# Patient Record
Sex: FEMALE | Race: ASIAN | Employment: OTHER | URBAN - METROPOLITAN AREA
[De-identification: names, ages, dates, MRNs, and addresses within clinical notes are randomized per-mention and may not be internally consistent; named-entity substitution may affect disease eponyms.]

---

## 2018-12-17 ENCOUNTER — OFFICE VISIT (OUTPATIENT)
Dept: INTERNAL MEDICINE CLINIC | Age: 83
End: 2018-12-17

## 2018-12-17 VITALS
DIASTOLIC BLOOD PRESSURE: 70 MMHG | BODY MASS INDEX: 26.31 KG/M2 | HEIGHT: 62 IN | SYSTOLIC BLOOD PRESSURE: 120 MMHG | HEART RATE: 40 BPM | WEIGHT: 143 LBS

## 2018-12-17 DIAGNOSIS — I10 HYPERTENSION, UNSPECIFIED TYPE: Primary | ICD-10-CM

## 2018-12-17 LAB
BILIRUBIN, POC: NORMAL
BLOOD URINE, POC: NORMAL
CLARITY, POC: CLEAR
COLOR, POC: NORMAL
GLUCOSE URINE, POC: NORMAL
KETONES, POC: NORMAL
LEUKOCYTE EST, POC: NORMAL
NITRITE, POC: NORMAL
PH, POC: 5
PROTEIN, POC: NORMAL
SPECIFIC GRAVITY, POC: 1.01
UROBILINOGEN, POC: 1

## 2018-12-17 PROCEDURE — 99205 OFFICE O/P NEW HI 60 MIN: CPT | Performed by: INTERNAL MEDICINE

## 2018-12-17 PROCEDURE — 1036F TOBACCO NON-USER: CPT | Performed by: INTERNAL MEDICINE

## 2018-12-17 PROCEDURE — G8419 CALC BMI OUT NRM PARAM NOF/U: HCPCS | Performed by: INTERNAL MEDICINE

## 2018-12-17 PROCEDURE — 1090F PRES/ABSN URINE INCON ASSESS: CPT | Performed by: INTERNAL MEDICINE

## 2018-12-17 PROCEDURE — 1101F PT FALLS ASSESS-DOCD LE1/YR: CPT | Performed by: INTERNAL MEDICINE

## 2018-12-17 PROCEDURE — G8484 FLU IMMUNIZE NO ADMIN: HCPCS | Performed by: INTERNAL MEDICINE

## 2018-12-17 PROCEDURE — 4040F PNEUMOC VAC/ADMIN/RCVD: CPT | Performed by: INTERNAL MEDICINE

## 2018-12-17 PROCEDURE — G8427 DOCREV CUR MEDS BY ELIG CLIN: HCPCS | Performed by: INTERNAL MEDICINE

## 2018-12-17 PROCEDURE — 1123F ACP DISCUSS/DSCN MKR DOCD: CPT | Performed by: INTERNAL MEDICINE

## 2018-12-17 PROCEDURE — 93000 ELECTROCARDIOGRAM COMPLETE: CPT | Performed by: INTERNAL MEDICINE

## 2018-12-17 PROCEDURE — 81002 URINALYSIS NONAUTO W/O SCOPE: CPT | Performed by: INTERNAL MEDICINE

## 2018-12-17 ASSESSMENT — PATIENT HEALTH QUESTIONNAIRE - PHQ9
2. FEELING DOWN, DEPRESSED OR HOPELESS: 1
SUM OF ALL RESPONSES TO PHQ QUESTIONS 1-9: 2
SUM OF ALL RESPONSES TO PHQ9 QUESTIONS 1 & 2: 2
1. LITTLE INTEREST OR PLEASURE IN DOING THINGS: 1
SUM OF ALL RESPONSES TO PHQ QUESTIONS 1-9: 2

## 2018-12-17 NOTE — PROGRESS NOTES
Right-sided epistaxis           Plan:      Repeat labs now with TSH    Check Echo to r/o AS  check carotid doppler    BP cuff correlation in 2 weeks     also now worried about her swallowing. .. X 4 months  Wt stable I live upstairs in a 2 story house  Daughter wants her to go to a home and I don't want to go. .  I want to remodel downstairs. .  Renovation is done now    Daughter interprets [place]. ..     F/u in 4 weeks    Denisha Alexander      HM: UTD  Advised annual fluvax every Salvador Romero MD

## 2019-01-11 ENCOUNTER — HOSPITAL ENCOUNTER (EMERGENCY)
Age: 84
Discharge: HOME | End: 2019-01-11
Attending: EMERGENCY MEDICINE
Payer: MEDICARE

## 2019-01-11 VITALS
WEIGHT: 138.44 LBS | HEIGHT: 64 IN | HEART RATE: 66 BPM | BODY MASS INDEX: 23.64 KG/M2 | DIASTOLIC BLOOD PRESSURE: 59 MMHG | TEMPERATURE: 97.5 F | OXYGEN SATURATION: 97 % | SYSTOLIC BLOOD PRESSURE: 125 MMHG | RESPIRATION RATE: 18 BRPM

## 2019-01-11 DIAGNOSIS — F43.0 STRESS REACTION: ICD-10-CM

## 2019-01-11 DIAGNOSIS — R11.0 NAUSEA: Primary | ICD-10-CM

## 2019-01-11 LAB
A/G RATIO: 1.7 (ref 1.1–2.2)
ALBUMIN SERPL-MCNC: 4.8 G/DL (ref 3.4–5)
ALP BLD-CCNC: 82 U/L (ref 40–129)
ALT SERPL-CCNC: 24 U/L (ref 10–40)
AMYLASE: 119 U/L (ref 25–115)
ANION GAP SERPL CALCULATED.3IONS-SCNC: 9 MMOL/L (ref 3–16)
AST SERPL-CCNC: 29 U/L (ref 15–37)
BACTERIA: ABNORMAL /HPF
BASOPHILS ABSOLUTE: 0 K/UL (ref 0–0.2)
BASOPHILS RELATIVE PERCENT: 0.5 %
BILIRUB SERPL-MCNC: 1 MG/DL (ref 0–1)
BILIRUBIN URINE: NEGATIVE
BLOOD, URINE: NEGATIVE
BUN BLDV-MCNC: 16 MG/DL (ref 7–20)
CALCIUM SERPL-MCNC: 9.7 MG/DL (ref 8.3–10.6)
CHLORIDE BLD-SCNC: 94 MMOL/L (ref 99–110)
CLARITY: ABNORMAL
CO2: 30 MMOL/L (ref 21–32)
COLOR: YELLOW
CREAT SERPL-MCNC: 0.7 MG/DL (ref 0.6–1.2)
EKG ATRIAL RATE: 69 BPM
EKG DIAGNOSIS: NORMAL
EKG P AXIS: 58 DEGREES
EKG P-R INTERVAL: 156 MS
EKG Q-T INTERVAL: 418 MS
EKG QRS DURATION: 90 MS
EKG QTC CALCULATION (BAZETT): 447 MS
EKG R AXIS: 5 DEGREES
EKG T AXIS: 47 DEGREES
EKG VENTRICULAR RATE: 69 BPM
EOSINOPHILS ABSOLUTE: 0 K/UL (ref 0–0.6)
EOSINOPHILS RELATIVE PERCENT: 0.9 %
EPITHELIAL CELLS, UA: 2 /HPF (ref 0–5)
GFR AFRICAN AMERICAN: >60
GFR NON-AFRICAN AMERICAN: >60
GLOBULIN: 2.9 G/DL
GLUCOSE BLD-MCNC: 106 MG/DL (ref 70–99)
GLUCOSE URINE: NEGATIVE MG/DL
HCT VFR BLD CALC: 43.6 % (ref 36–48)
HEMOGLOBIN: 14.5 G/DL (ref 12–16)
HYALINE CASTS: 0 /LPF (ref 0–8)
KETONES, URINE: NEGATIVE MG/DL
LEUKOCYTE ESTERASE, URINE: NEGATIVE
LIPASE: 76 U/L (ref 13–60)
LYMPHOCYTES ABSOLUTE: 0.9 K/UL (ref 1–5.1)
LYMPHOCYTES RELATIVE PERCENT: 17.8 %
MCH RBC QN AUTO: 29.5 PG (ref 26–34)
MCHC RBC AUTO-ENTMCNC: 33.2 G/DL (ref 31–36)
MCV RBC AUTO: 89.1 FL (ref 80–100)
MICROSCOPIC EXAMINATION: YES
MONOCYTES ABSOLUTE: 0.4 K/UL (ref 0–1.3)
MONOCYTES RELATIVE PERCENT: 7.7 %
NEUTROPHILS ABSOLUTE: 3.7 K/UL (ref 1.7–7.7)
NEUTROPHILS RELATIVE PERCENT: 73.1 %
NITRITE, URINE: NEGATIVE
PDW BLD-RTO: 14.7 % (ref 12.4–15.4)
PH UA: 8
PLATELET # BLD: 192 K/UL (ref 135–450)
PMV BLD AUTO: 7.8 FL (ref 5–10.5)
POTASSIUM SERPL-SCNC: 4.4 MMOL/L (ref 3.5–5.1)
PROTEIN UA: NEGATIVE MG/DL
RBC # BLD: 4.89 M/UL (ref 4–5.2)
RBC UA: 2 /HPF (ref 0–4)
SODIUM BLD-SCNC: 133 MMOL/L (ref 136–145)
SPECIFIC GRAVITY UA: 1.01
TOTAL PROTEIN: 7.7 G/DL (ref 6.4–8.2)
TROPONIN: <0.01 NG/ML
URINE REFLEX TO CULTURE: ABNORMAL
URINE TYPE: ABNORMAL
UROBILINOGEN, URINE: 0.2 E.U./DL
WBC # BLD: 5 K/UL (ref 4–11)
WBC UA: 1 /HPF (ref 0–5)

## 2019-01-11 PROCEDURE — 93010 ELECTROCARDIOGRAM REPORT: CPT | Performed by: INTERNAL MEDICINE

## 2019-01-11 PROCEDURE — 99283 EMERGENCY DEPT VISIT LOW MDM: CPT

## 2019-01-11 PROCEDURE — 80053 COMPREHEN METABOLIC PANEL: CPT

## 2019-01-11 PROCEDURE — 81001 URINALYSIS AUTO W/SCOPE: CPT

## 2019-01-11 PROCEDURE — 82150 ASSAY OF AMYLASE: CPT

## 2019-01-11 PROCEDURE — 84484 ASSAY OF TROPONIN QUANT: CPT

## 2019-01-11 PROCEDURE — 85025 COMPLETE CBC W/AUTO DIFF WBC: CPT

## 2019-01-11 PROCEDURE — 83690 ASSAY OF LIPASE: CPT

## 2019-01-11 PROCEDURE — 93005 ELECTROCARDIOGRAM TRACING: CPT | Performed by: EMERGENCY MEDICINE

## 2019-01-11 PROCEDURE — 36415 COLL VENOUS BLD VENIPUNCTURE: CPT

## 2019-01-11 RX ORDER — ONDANSETRON 4 MG/1
4 TABLET, ORALLY DISINTEGRATING ORAL EVERY 8 HOURS PRN
Qty: 10 TABLET | Refills: 0 | Status: SHIPPED | OUTPATIENT
Start: 2019-01-11 | End: 2019-07-15

## 2019-03-29 ENCOUNTER — HOSPITAL ENCOUNTER (EMERGENCY)
Age: 84
Discharge: HOME | End: 2019-03-29
Attending: EMERGENCY MEDICINE
Payer: MEDICARE

## 2019-03-29 ENCOUNTER — APPOINTMENT (OUTPATIENT)
Dept: CT IMAGING | Age: 84
End: 2019-03-29
Payer: MEDICARE

## 2019-03-29 ENCOUNTER — APPOINTMENT (OUTPATIENT)
Dept: GENERAL RADIOLOGY | Age: 84
End: 2019-03-29
Payer: MEDICARE

## 2019-03-29 VITALS
HEIGHT: 64 IN | DIASTOLIC BLOOD PRESSURE: 66 MMHG | SYSTOLIC BLOOD PRESSURE: 156 MMHG | HEART RATE: 70 BPM | RESPIRATION RATE: 18 BRPM | WEIGHT: 138 LBS | OXYGEN SATURATION: 99 % | TEMPERATURE: 97.5 F | BODY MASS INDEX: 23.56 KG/M2

## 2019-03-29 DIAGNOSIS — I10 UNCONTROLLED HYPERTENSION: Primary | ICD-10-CM

## 2019-03-29 DIAGNOSIS — Z76.0 ENCOUNTER FOR MEDICATION REFILL: ICD-10-CM

## 2019-03-29 DIAGNOSIS — E87.1 HYPONATREMIA: ICD-10-CM

## 2019-03-29 LAB
A/G RATIO: 1.4 (ref 1.1–2.2)
ALBUMIN SERPL-MCNC: 4.3 G/DL (ref 3.4–5)
ALP BLD-CCNC: 76 U/L (ref 40–129)
ALT SERPL-CCNC: 31 U/L (ref 10–40)
ANION GAP SERPL CALCULATED.3IONS-SCNC: 14 MMOL/L (ref 3–16)
AST SERPL-CCNC: 39 U/L (ref 15–37)
BASOPHILS ABSOLUTE: 0 K/UL (ref 0–0.2)
BASOPHILS RELATIVE PERCENT: 0.9 %
BILIRUB SERPL-MCNC: 0.6 MG/DL (ref 0–1)
BUN BLDV-MCNC: 14 MG/DL (ref 7–20)
CALCIUM SERPL-MCNC: 8.9 MG/DL (ref 8.3–10.6)
CHLORIDE BLD-SCNC: 90 MMOL/L (ref 99–110)
CO2: 24 MMOL/L (ref 21–32)
CREAT SERPL-MCNC: <0.5 MG/DL (ref 0.6–1.2)
EOSINOPHILS ABSOLUTE: 0 K/UL (ref 0–0.6)
EOSINOPHILS RELATIVE PERCENT: 1.1 %
GFR AFRICAN AMERICAN: >60
GFR NON-AFRICAN AMERICAN: >60
GLOBULIN: 3.1 G/DL
GLUCOSE BLD-MCNC: 101 MG/DL (ref 70–99)
HCT VFR BLD CALC: 40.7 % (ref 36–48)
HEMOGLOBIN: 13.6 G/DL (ref 12–16)
LYMPHOCYTES ABSOLUTE: 0.9 K/UL (ref 1–5.1)
LYMPHOCYTES RELATIVE PERCENT: 22.9 %
MCH RBC QN AUTO: 29.4 PG (ref 26–34)
MCHC RBC AUTO-ENTMCNC: 33.3 G/DL (ref 31–36)
MCV RBC AUTO: 88.1 FL (ref 80–100)
MONOCYTES ABSOLUTE: 0.5 K/UL (ref 0–1.3)
MONOCYTES RELATIVE PERCENT: 12.5 %
NEUTROPHILS ABSOLUTE: 2.5 K/UL (ref 1.7–7.7)
NEUTROPHILS RELATIVE PERCENT: 62.6 %
PDW BLD-RTO: 13.3 % (ref 12.4–15.4)
PLATELET # BLD: 215 K/UL (ref 135–450)
PMV BLD AUTO: 7.4 FL (ref 5–10.5)
POTASSIUM SERPL-SCNC: 4.2 MMOL/L (ref 3.5–5.1)
RBC # BLD: 4.62 M/UL (ref 4–5.2)
SODIUM BLD-SCNC: 128 MMOL/L (ref 136–145)
TOTAL PROTEIN: 7.4 G/DL (ref 6.4–8.2)
TROPONIN: <0.01 NG/ML
WBC # BLD: 4 K/UL (ref 4–11)

## 2019-03-29 PROCEDURE — 85025 COMPLETE CBC W/AUTO DIFF WBC: CPT

## 2019-03-29 PROCEDURE — 84484 ASSAY OF TROPONIN QUANT: CPT

## 2019-03-29 PROCEDURE — 80053 COMPREHEN METABOLIC PANEL: CPT

## 2019-03-29 PROCEDURE — 71045 X-RAY EXAM CHEST 1 VIEW: CPT

## 2019-03-29 PROCEDURE — 70450 CT HEAD/BRAIN W/O DYE: CPT

## 2019-03-29 PROCEDURE — 99284 EMERGENCY DEPT VISIT MOD MDM: CPT

## 2019-03-29 PROCEDURE — 93005 ELECTROCARDIOGRAM TRACING: CPT | Performed by: EMERGENCY MEDICINE

## 2019-03-29 RX ORDER — LOSARTAN POTASSIUM 50 MG/1
50 TABLET ORAL 2 TIMES DAILY
Qty: 30 TABLET | Refills: 1 | Status: SHIPPED | OUTPATIENT
Start: 2019-03-29

## 2019-03-29 ASSESSMENT — ENCOUNTER SYMPTOMS
VOMITING: 0
SHORTNESS OF BREATH: 0
ABDOMINAL DISTENTION: 0
DIARRHEA: 0
NAUSEA: 0
COUGH: 0
STRIDOR: 0
COLOR CHANGE: 0
WHEEZING: 0
ABDOMINAL PAIN: 0
BACK PAIN: 0
ALLERGIC/IMMUNOLOGIC NEGATIVE: 1
CONSTIPATION: 0

## 2019-03-30 LAB
EKG ATRIAL RATE: 78 BPM
EKG DIAGNOSIS: NORMAL
EKG P AXIS: 52 DEGREES
EKG P-R INTERVAL: 162 MS
EKG Q-T INTERVAL: 418 MS
EKG QRS DURATION: 90 MS
EKG QTC CALCULATION (BAZETT): 476 MS
EKG R AXIS: -4 DEGREES
EKG T AXIS: 54 DEGREES
EKG VENTRICULAR RATE: 78 BPM

## 2019-03-30 PROCEDURE — 93010 ELECTROCARDIOGRAM REPORT: CPT | Performed by: INTERNAL MEDICINE

## 2019-03-30 NOTE — ED PROVIDER NOTES
systems for level 4, 10 or more for level 5)     Review of Systems   Constitutional: Negative for chills and fever. HENT: Negative. Eyes: Negative for visual disturbance. Respiratory: Negative for cough, shortness of breath, wheezing and stridor. Cardiovascular: Negative for chest pain, palpitations and leg swelling. Gastrointestinal: Negative for abdominal distention, abdominal pain, constipation, diarrhea, nausea and vomiting. Endocrine: Negative. Genitourinary: Negative. Musculoskeletal: Negative for back pain, neck pain and neck stiffness. Skin: Negative for color change, pallor, rash and wound. Allergic/Immunologic: Negative. Neurological: Negative for dizziness, tremors, seizures, syncope, facial asymmetry, speech difficulty, weakness, light-headedness, numbness and headaches. Hematological: Negative. Psychiatric/Behavioral: Negative for confusion. All other systems reviewed and are negative. Positives and Pertinent negatives as per HPI. Except as noted abovein the ROS, all other systems were reviewed and negative. PAST MEDICAL HISTORY     Past Medical History:   Diagnosis Date    Hypertension     Vertigo     at times         SURGICAL HISTORY     Past Surgical History:   Procedure Laterality Date    APPENDECTOMY      HYSTERECTOMY      THYROID SURGERY           [address]       Current Discharge Medication List      CONTINUE these medications which have NOT CHANGED    Details   ondansetron (ZOFRAN ODT) 4 MG disintegrating tablet Take 1 tablet by mouth every 8 hours as needed for Nausea Let dissolve in mouth. Qty: 10 tablet, Refills: 0      hydrochlorothiazide (MICROZIDE) 12.5 MG capsule Take 12.5 mg by mouth daily      Aloe-Sodium Chloride (AYR SALINE NASAL GEL) SWAB Apply in both nostrils twice a day for nasal dryness  Qty: 1 each, Refills: 2    Associated Diagnoses: Right-sided epistaxis      aspirin 81 MG EC tablet Take 1 tablet by mouth daily.  Do not take this until the bleeding is gone for 48 hours. Qty: 30 tablet, Refills: 0      calcium carbonate (OSCAL) 500 MG TABS tablet Take 500 mg by mouth daily. therapeutic multivitamin-minerals (THERAGRAN-M) tablet Take 1 tablet by mouth daily.                  ALLERGIES     Ciprofloxacin; Seasonal; and Zoloft [sertraline hcl]    FAMILYHISTORY       Family History   Problem Relation Age of Onset    Diabetes Mother     Heart Disease Father           SOCIAL HISTORY       Social History     Socioeconomic History    Marital status: Single     Spouse name: None    Number of children: None    Years of education: None    Highest education level: None   Occupational History    None   Social Needs    Financial resource strain: None    Food insecurity:     Worry: None     Inability: None    Transportation needs:     Medical: None     Non-medical: None   Tobacco Use    Smoking status: Former Smoker     Last attempt to quit: 1960     Years since quittin.2    Smokeless tobacco: Never Used   Substance and Sexual Activity    Alcohol use: No    Drug use: No    Sexual activity: None   Lifestyle    Physical activity:     Days per week: None     Minutes per session: None    Stress: None   Relationships    Social connections:     Talks on phone: None     Gets together: None     Attends Anabaptist service: None     Active member of club or organization: None     Attends meetings of clubs or organizations: None     Relationship status: None    Intimate partner violence:     Fear of current or ex partner: None     Emotionally abused: None     Physically abused: None     Forced sexual activity: None   Other Topics Concern    None   Social History Narrative    None       SCREENINGS             PHYSICAL EXAM    (up to 7 for level 4, 8 or more for level 5)     ED Triage Vitals [19]   BP Temp Temp Source Pulse Resp SpO2 Height Weight   (!) 197/79 97.5 °F (36.4 °C) Oral 73 16 99 % 5' 4\" (1.626 m) 138 lb (62.6 kg)       Physical Exam   Constitutional: She is oriented to person, place, and time. She appears well-developed and well-nourished. No distress. HENT:   Head: Normocephalic and atraumatic. Right Ear: External ear normal.   Left Ear: External ear normal.   Nose: Nose normal.   Mouth/Throat: Oropharynx is clear and moist.   Eyes: Pupils are equal, round, and reactive to light. Conjunctivae and EOM are normal. Right eye exhibits no discharge. Left eye exhibits no discharge. No scleral icterus. Neck: Normal range of motion. No JVD present. Cardiovascular: Normal rate. Pulmonary/Chest: Effort normal and breath sounds normal.   Abdominal: Soft. Bowel sounds are normal. She exhibits no distension. There is no tenderness. Musculoskeletal: Normal range of motion. No extremity edema, posterior calf or thigh tenderness, palpable cord, discoloration. Negative homans. Lymphadenopathy:     She has no cervical adenopathy. Neurological: She is alert and oriented to person, place, and time. She displays normal reflexes. No cranial nerve deficit (II-XII intact) or sensory deficit. Skin: Skin is warm and dry. Capillary refill takes less than 2 seconds. No rash noted. She is not diaphoretic. No erythema. No pallor. Psychiatric: She has a normal mood and affect. Her behavior is normal.   Nursing note and vitals reviewed.       DIAGNOSTIC RESULTS   LABS:    Labs Reviewed   CBC WITH AUTO DIFFERENTIAL - Abnormal; Notable for the following components:       Result Value    Lymphocytes # 0.9 (*)     All other components within normal limits    Narrative:     Performed at:  [facility] [address], [facility], [address]   Phone [phone]   COMPREHENSIVE METABOLIC PANEL - Abnormal; Notable for the following components:    Sodium 128 (*)     Chloride 90 (*)     Glucose 101 (*)     CREATININE <0.5 (*)     AST 39 (*)     All other components within normal limits Narrative:     Performed at:  [facility]  [address] [place],  [place], [address]   Phone [phone]   TROPONIN    Narrative:     Performed at:  [facility]  [address] [place],  [place], [address]   Phone [phone]       All other labs were within normal range or not returned as of this dictation. EKG: All EKG's are interpreted by the Emergency Department Physician who either signs orCo-signs this chart in the absence of a cardiologist.  Please see their note for interpretation of EKG. RADIOLOGY:   Non-plain film images such as CT, Ultrasound and MRI are read by the radiologist. Plain radiographic images are visualized andpreliminarily interpreted by the  ED Provider with the below findings:        Interpretation perthe Radiologist below, if available at the time of this note:    CT HEAD WO CONTRAST   Final Result   No acute intracranial abnormality. XR CHEST PORTABLE   Final Result   No acute abnormality           Xr Chest Portable    Result Date: 3/29/2019  EXAMINATION: SINGLE XRAY VIEW OF THE CHEST 3/29/2019 8:50 pm COMPARISON: None. HISTORY: ORDERING SYSTEM PROVIDED HISTORY: dizzy/htn TECHNOLOGIST PROVIDED HISTORY: Reason for exam:->dizzy/htn Ordering Physician Provided Reason for Exam: Pt inby FF squad squad for high BP. Has felt it running high for two days. Denies chest pain. Dizziness upon standing from squad cot. Acuity: Acute Type of Exam: Initial FINDINGS: Heart size at the upper limits of normal.  There is no lung consolidation.  There is no effusion or pneumothorax     No acute abnormality         PROCEDURES   Unless otherwise noted below, none     Procedures    CRITICAL CARE TIME   N/A    CONSULTS:  None      EMERGENCY DEPARTMENT COURSE and DIFFERENTIALDIAGNOSIS/MDM:   Vitals:    Vitals:    03/29/19 2130 03/29/19 2132 03/29/19 2200 03/29/19 2230   BP: (!) 179/71  (!) 166/71 (!) 156/66   Pulse:  67 71 70   Resp:  20 17 18   Temp:       TempSrc:       SpO2:  97% 99% 99%   Weight:       Height:           Patient was given thefollowing medications:  Medications - No data to display  This patient presents to the emergency department with elevated blood pressure. Despite triage note, denies any dizziness. She does have some asymptomatic hyponatremia, therefore advised to follow up with primary care doctor about this finding. EKG appears stable. Has no chest pain or shortness of breath. She is simply requesting a refill of Cozaar and for her to be written for twice a day instead of once a day. CT head shows no acute intracranial abnormality. Vitals improved here. My suspicion is low for carotid dissection, sinus abscess, acute fracture, acute CVA, ICH, SAH, TIA, meningitis, encephalitis, pseudotumor cerebri, temporal arteritis, sentinel bleed from ruptured aneurysm, hypertensive urgency or emergency, subdural hematoma, epidural hematoma, ACS, PE, myocarditis, pericarditis, endocarditis, acute pulmonary edema, pleural effusion, pericardial effusion, cardiac tamponade, cardiomyopathy, CHF exacerbation, thoracic aortic dissection, esophageal rupture, other life-threatening arrhythmia, hypertensive urgency or emergency, hemothorax, pulmonary contusion, subcutaneous emphysema, flail chest, pneumo mediastinum, rib fracture, pneumonia or other concerning pathology. We have addressed concerns and expectations. FINAL IMPRESSION      1. Uncontrolled hypertension    2. Encounter for medication refill    3.  Hyponatremia          DISPOSITION/PLAN   DISPOSITION Decision To Discharge 03/29/2019 09:51:10 PM      PATIENT REFERREDTO:  Ben Conway  [address] [facility] [phone]      for follow up in 1-3 days      DISCHARGE MEDICATIONS:  Current Discharge Medication List          DISCONTINUED MEDICATIONS:  Current Discharge Medication List                 (Please note that portions ofthis note were completed with a voice recognition program.  Efforts were made to edit the dictations but occasionally words are mis-transcribed.)    Alan Mlilard PA-C (electronically signed)           Alan Millard PA-C  03/29/19 2729

## 2019-03-30 NOTE — ED PROVIDER NOTES
I have personally performed a face to face diagnostic evaluation on this patient. I have fully participated in the care of this patient. I have reviewed and agree with all pertinent clinical informationincluding history, physical exam, diagnostic tests, and the plan. History and Physical as follows:  HPI    Presents to the emergency room because of high blood pressure out of her medication. Physical Exam    GENERAL: Patient appeared well-developed, well-nourished, vital signs reviewed. MENTAL STATUS: Patient is awake and alert   HEAD AND FACE :Head is normalcephalic. Face normal appearance  EYES: eyes lids and conjunctiva appear normal  ENT :ears and nose appear normal externally. CV: Heart regular rate and rhythm without murmur,  LUNGS: Lungs are clear to auscultation   PSYCHIATRIC:mood and affect normal    NEUROLOGIC: no weakness or numbness noted   This is a computerized dictation.  I have made every effort to proofread this chart, however, transcription errors may exist.         Twilla Kanner,   03/29/19 7692

## 2019-07-15 ENCOUNTER — APPOINTMENT (OUTPATIENT)
Dept: GENERAL RADIOLOGY | Age: 84
End: 2019-07-15
Payer: MEDICARE

## 2019-07-15 ENCOUNTER — APPOINTMENT (OUTPATIENT)
Dept: CT IMAGING | Age: 84
End: 2019-07-15
Payer: MEDICARE

## 2019-07-15 ENCOUNTER — HOSPITAL ENCOUNTER (EMERGENCY)
Age: 84
Discharge: HOME | End: 2019-07-15
Attending: FAMILY MEDICINE
Payer: MEDICARE

## 2019-07-15 VITALS
RESPIRATION RATE: 16 BRPM | TEMPERATURE: 97.6 F | BODY MASS INDEX: 23.56 KG/M2 | WEIGHT: 138 LBS | DIASTOLIC BLOOD PRESSURE: 73 MMHG | HEIGHT: 64 IN | OXYGEN SATURATION: 99 % | HEART RATE: 66 BPM | SYSTOLIC BLOOD PRESSURE: 164 MMHG

## 2019-07-15 DIAGNOSIS — R11.2 NON-INTRACTABLE VOMITING WITH NAUSEA, UNSPECIFIED VOMITING TYPE: Primary | ICD-10-CM

## 2019-07-15 DIAGNOSIS — E87.1 HYPONATREMIA: ICD-10-CM

## 2019-07-15 DIAGNOSIS — I10 ESSENTIAL HYPERTENSION: ICD-10-CM

## 2019-07-15 LAB
A/G RATIO: 1.4 (ref 1.1–2.2)
ALBUMIN SERPL-MCNC: 4.4 G/DL (ref 3.4–5)
ALP BLD-CCNC: 74 U/L (ref 40–129)
ALT SERPL-CCNC: 32 U/L (ref 10–40)
ANION GAP SERPL CALCULATED.3IONS-SCNC: 16 MMOL/L (ref 3–16)
AST SERPL-CCNC: 39 U/L (ref 15–37)
BASOPHILS ABSOLUTE: 0 K/UL (ref 0–0.2)
BASOPHILS RELATIVE PERCENT: 1 %
BILIRUB SERPL-MCNC: 0.8 MG/DL (ref 0–1)
BUN BLDV-MCNC: 20 MG/DL (ref 7–20)
CALCIUM SERPL-MCNC: 8.8 MG/DL (ref 8.3–10.6)
CHLORIDE BLD-SCNC: 90 MMOL/L (ref 99–110)
CO2: 22 MMOL/L (ref 21–32)
CREAT SERPL-MCNC: 0.6 MG/DL (ref 0.6–1.2)
EKG ATRIAL RATE: 65 BPM
EKG DIAGNOSIS: NORMAL
EKG P AXIS: 72 DEGREES
EKG P-R INTERVAL: 146 MS
EKG Q-T INTERVAL: 470 MS
EKG QRS DURATION: 102 MS
EKG QTC CALCULATION (BAZETT): 488 MS
EKG R AXIS: -4 DEGREES
EKG T AXIS: 41 DEGREES
EKG VENTRICULAR RATE: 65 BPM
EOSINOPHILS ABSOLUTE: 0 K/UL (ref 0–0.6)
EOSINOPHILS RELATIVE PERCENT: 0.4 %
GFR AFRICAN AMERICAN: >60
GFR NON-AFRICAN AMERICAN: >60
GLOBULIN: 3.2 G/DL
GLUCOSE BLD-MCNC: 150 MG/DL (ref 70–99)
HCT VFR BLD CALC: 42.8 % (ref 36–48)
HEMOGLOBIN: 14 G/DL (ref 12–16)
LIPASE: 49 U/L (ref 13–60)
LYMPHOCYTES ABSOLUTE: 0.6 K/UL (ref 1–5.1)
LYMPHOCYTES RELATIVE PERCENT: 14 %
MCH RBC QN AUTO: 29.2 PG (ref 26–34)
MCHC RBC AUTO-ENTMCNC: 32.8 G/DL (ref 31–36)
MCV RBC AUTO: 89 FL (ref 80–100)
MONOCYTES ABSOLUTE: 0.2 K/UL (ref 0–1.3)
MONOCYTES RELATIVE PERCENT: 5.1 %
NEUTROPHILS ABSOLUTE: 3.6 K/UL (ref 1.7–7.7)
NEUTROPHILS RELATIVE PERCENT: 79.5 %
PDW BLD-RTO: 14 % (ref 12.4–15.4)
PLATELET # BLD: 186 K/UL (ref 135–450)
PMV BLD AUTO: 7.7 FL (ref 5–10.5)
POTASSIUM SERPL-SCNC: 3.5 MMOL/L (ref 3.5–5.1)
RBC # BLD: 4.8 M/UL (ref 4–5.2)
SODIUM BLD-SCNC: 128 MMOL/L (ref 136–145)
TOTAL PROTEIN: 7.6 G/DL (ref 6.4–8.2)
TROPONIN: <0.01 NG/ML
WBC # BLD: 4.6 K/UL (ref 4–11)

## 2019-07-15 PROCEDURE — 70450 CT HEAD/BRAIN W/O DYE: CPT

## 2019-07-15 PROCEDURE — 84484 ASSAY OF TROPONIN QUANT: CPT

## 2019-07-15 PROCEDURE — 85025 COMPLETE CBC W/AUTO DIFF WBC: CPT

## 2019-07-15 PROCEDURE — 96374 THER/PROPH/DIAG INJ IV PUSH: CPT

## 2019-07-15 PROCEDURE — 96361 HYDRATE IV INFUSION ADD-ON: CPT

## 2019-07-15 PROCEDURE — 2580000003 HC RX 258: Performed by: PHYSICIAN ASSISTANT

## 2019-07-15 PROCEDURE — 80053 COMPREHEN METABOLIC PANEL: CPT

## 2019-07-15 PROCEDURE — 6360000002 HC RX W HCPCS: Performed by: FAMILY MEDICINE

## 2019-07-15 PROCEDURE — 83690 ASSAY OF LIPASE: CPT

## 2019-07-15 PROCEDURE — 99285 EMERGENCY DEPT VISIT HI MDM: CPT

## 2019-07-15 PROCEDURE — 93005 ELECTROCARDIOGRAM TRACING: CPT | Performed by: FAMILY MEDICINE

## 2019-07-15 PROCEDURE — 6370000000 HC RX 637 (ALT 250 FOR IP): Performed by: PHYSICIAN ASSISTANT

## 2019-07-15 PROCEDURE — 71046 X-RAY EXAM CHEST 2 VIEWS: CPT

## 2019-07-15 PROCEDURE — 93010 ELECTROCARDIOGRAM REPORT: CPT | Performed by: INTERNAL MEDICINE

## 2019-07-15 RX ORDER — ONDANSETRON 2 MG/ML
4 INJECTION INTRAMUSCULAR; INTRAVENOUS EVERY 30 MIN PRN
Status: DISCONTINUED | OUTPATIENT
Start: 2019-07-15 | End: 2019-07-15 | Stop reason: HOSPADM

## 2019-07-15 RX ORDER — LOSARTAN POTASSIUM 25 MG/1
50 TABLET ORAL ONCE
Status: COMPLETED | OUTPATIENT
Start: 2019-07-15 | End: 2019-07-15

## 2019-07-15 RX ORDER — ONDANSETRON 4 MG/1
4 TABLET, FILM COATED ORAL EVERY 8 HOURS PRN
Qty: 20 TABLET | Refills: 0 | Status: ON HOLD | OUTPATIENT
Start: 2019-07-15 | End: 2020-04-07 | Stop reason: HOSPADM

## 2019-07-15 RX ORDER — 0.9 % SODIUM CHLORIDE 0.9 %
1000 INTRAVENOUS SOLUTION INTRAVENOUS ONCE
Status: COMPLETED | OUTPATIENT
Start: 2019-07-15 | End: 2019-07-15

## 2019-07-15 RX ADMIN — LOSARTAN POTASSIUM 50 MG: 25 TABLET ORAL at 01:15

## 2019-07-15 RX ADMIN — SODIUM CHLORIDE 1000 ML: 9 INJECTION, SOLUTION INTRAVENOUS at 02:10

## 2019-07-15 RX ADMIN — ONDANSETRON 4 MG: 2 INJECTION INTRAMUSCULAR; INTRAVENOUS at 01:13

## 2019-07-15 ASSESSMENT — ENCOUNTER SYMPTOMS
EYE REDNESS: 0
SHORTNESS OF BREATH: 0
EYE PAIN: 0
ANAL BLEEDING: 0
BACK PAIN: 0
COLOR CHANGE: 0
VOMITING: 1
NAUSEA: 1
BLOOD IN STOOL: 0
DIARRHEA: 0
CONSTIPATION: 0
ABDOMINAL DISTENTION: 0
WHEEZING: 0
ABDOMINAL PAIN: 0
STRIDOR: 0
EYE DISCHARGE: 0
COUGH: 0
PHOTOPHOBIA: 0
RECTAL PAIN: 0

## 2019-07-15 NOTE — ED NOTES
Pt presents with vomiting x tonight. Brought in via EMS. Denies any pain anywhere. No vomiting noted on arrival but emesis was noted on pt's shirt. Saline lock intact left forearm with blood drawn and sent to lab. Abdomen soft. Respirations even and unlabored. Neighbor with pt to check on her. Warm blanket provided and call bell in reach.      Gilberto Winn RN  07/15/19 9667

## 2019-07-15 NOTE — ED NOTES
Neighbor called to take pt home. DC instructions given - DC home with neighbor.  Escorted pt to car via wheelchair     Terri Baker RN  07/15/19 9486

## 2020-04-05 ENCOUNTER — APPOINTMENT (OUTPATIENT)
Dept: GENERAL RADIOLOGY | Age: 85
DRG: 470 | End: 2020-04-05
Payer: MEDICARE

## 2020-04-05 ENCOUNTER — HOSPITAL ENCOUNTER (INPATIENT)
Age: 85
LOS: 3 days | Discharge: NURSING, REHAB OR LONG-TERM CARE | DRG: 470 | End: 2020-04-08
Attending: EMERGENCY MEDICINE | Admitting: INTERNAL MEDICINE
Payer: MEDICARE

## 2020-04-05 PROBLEM — S72.002A LEFT DISPLACED FEMORAL NECK FRACTURE (HCC): Status: ACTIVE | Noted: 2020-04-05

## 2020-04-05 PROBLEM — I10 ESSENTIAL HYPERTENSION: Chronic | Status: ACTIVE | Noted: 2020-04-05

## 2020-04-05 LAB
A/G RATIO: 1.6 (ref 1.1–2.2)
ABO/RH: NORMAL
ALBUMIN SERPL-MCNC: 4.8 G/DL (ref 3.4–5)
ALP BLD-CCNC: 115 U/L (ref 40–129)
ALT SERPL-CCNC: 22 U/L (ref 10–40)
ANION GAP SERPL CALCULATED.3IONS-SCNC: 14 MMOL/L (ref 3–16)
ANTIBODY SCREEN: NORMAL
AST SERPL-CCNC: 32 U/L (ref 15–37)
BASOPHILS ABSOLUTE: 0 K/UL (ref 0–0.2)
BASOPHILS RELATIVE PERCENT: 1 %
BILIRUB SERPL-MCNC: 0.5 MG/DL (ref 0–1)
BUN BLDV-MCNC: 21 MG/DL (ref 7–20)
CALCIUM SERPL-MCNC: 9.4 MG/DL (ref 8.3–10.6)
CHLORIDE BLD-SCNC: 93 MMOL/L (ref 99–110)
CO2: 25 MMOL/L (ref 21–32)
CREAT SERPL-MCNC: 0.6 MG/DL (ref 0.6–1.2)
EOSINOPHILS ABSOLUTE: 0 K/UL (ref 0–0.6)
EOSINOPHILS RELATIVE PERCENT: 1.1 %
GFR AFRICAN AMERICAN: >60
GFR NON-AFRICAN AMERICAN: >60
GLOBULIN: 3 G/DL
GLUCOSE BLD-MCNC: 99 MG/DL (ref 70–99)
HCT VFR BLD CALC: 42.5 % (ref 36–48)
HEMOGLOBIN: 14.2 G/DL (ref 12–16)
LYMPHOCYTES ABSOLUTE: 1.3 K/UL (ref 1–5.1)
LYMPHOCYTES RELATIVE PERCENT: 36 %
MCH RBC QN AUTO: 29.7 PG (ref 26–34)
MCHC RBC AUTO-ENTMCNC: 33.4 G/DL (ref 31–36)
MCV RBC AUTO: 89 FL (ref 80–100)
MONOCYTES ABSOLUTE: 0.4 K/UL (ref 0–1.3)
MONOCYTES RELATIVE PERCENT: 11.3 %
NEUTROPHILS ABSOLUTE: 1.9 K/UL (ref 1.7–7.7)
NEUTROPHILS RELATIVE PERCENT: 50.6 %
PDW BLD-RTO: 14 % (ref 12.4–15.4)
PLATELET # BLD: 168 K/UL (ref 135–450)
PMV BLD AUTO: 7.4 FL (ref 5–10.5)
POTASSIUM SERPL-SCNC: 3.9 MMOL/L (ref 3.5–5.1)
RBC # BLD: 4.77 M/UL (ref 4–5.2)
SODIUM BLD-SCNC: 132 MMOL/L (ref 136–145)
TOTAL PROTEIN: 7.8 G/DL (ref 6.4–8.2)
WBC # BLD: 3.8 K/UL (ref 4–11)

## 2020-04-05 PROCEDURE — 86901 BLOOD TYPING SEROLOGIC RH(D): CPT

## 2020-04-05 PROCEDURE — 86850 RBC ANTIBODY SCREEN: CPT

## 2020-04-05 PROCEDURE — 6370000000 HC RX 637 (ALT 250 FOR IP): Performed by: INTERNAL MEDICINE

## 2020-04-05 PROCEDURE — 80053 COMPREHEN METABOLIC PANEL: CPT

## 2020-04-05 PROCEDURE — 93005 ELECTROCARDIOGRAM TRACING: CPT | Performed by: NURSE PRACTITIONER

## 2020-04-05 PROCEDURE — 1200000000 HC SEMI PRIVATE

## 2020-04-05 PROCEDURE — 85025 COMPLETE CBC W/AUTO DIFF WBC: CPT

## 2020-04-05 PROCEDURE — 86900 BLOOD TYPING SEROLOGIC ABO: CPT

## 2020-04-05 PROCEDURE — 71045 X-RAY EXAM CHEST 1 VIEW: CPT

## 2020-04-05 PROCEDURE — 99285 EMERGENCY DEPT VISIT HI MDM: CPT

## 2020-04-05 PROCEDURE — 6370000000 HC RX 637 (ALT 250 FOR IP): Performed by: NURSE PRACTITIONER

## 2020-04-05 PROCEDURE — 2580000003 HC RX 258: Performed by: INTERNAL MEDICINE

## 2020-04-05 PROCEDURE — 51702 INSERT TEMP BLADDER CATH: CPT

## 2020-04-05 PROCEDURE — 73502 X-RAY EXAM HIP UNI 2-3 VIEWS: CPT

## 2020-04-05 RX ORDER — ACETAMINOPHEN 500 MG
1000 TABLET ORAL ONCE
Status: COMPLETED | OUTPATIENT
Start: 2020-04-05 | End: 2020-04-05

## 2020-04-05 RX ORDER — MORPHINE SULFATE 4 MG/ML
4 INJECTION, SOLUTION INTRAMUSCULAR; INTRAVENOUS ONCE
Status: DISCONTINUED | OUTPATIENT
Start: 2020-04-05 | End: 2020-04-06

## 2020-04-05 RX ORDER — HYDROCODONE BITARTRATE AND ACETAMINOPHEN 5; 325 MG/1; MG/1
1 TABLET ORAL EVERY 4 HOURS PRN
Status: DISCONTINUED | OUTPATIENT
Start: 2020-04-05 | End: 2020-04-06

## 2020-04-05 RX ORDER — LOSARTAN POTASSIUM 25 MG/1
50 TABLET ORAL 2 TIMES DAILY
Status: DISCONTINUED | OUTPATIENT
Start: 2020-04-05 | End: 2020-04-08 | Stop reason: HOSPADM

## 2020-04-05 RX ORDER — SODIUM CHLORIDE 0.9 % (FLUSH) 0.9 %
10 SYRINGE (ML) INJECTION PRN
Status: DISCONTINUED | OUTPATIENT
Start: 2020-04-05 | End: 2020-04-06

## 2020-04-05 RX ORDER — ACETAMINOPHEN 325 MG/1
650 TABLET ORAL EVERY 6 HOURS PRN
Status: DISCONTINUED | OUTPATIENT
Start: 2020-04-05 | End: 2020-04-06

## 2020-04-05 RX ORDER — PROMETHAZINE HYDROCHLORIDE 25 MG/1
12.5 TABLET ORAL EVERY 6 HOURS PRN
Status: DISCONTINUED | OUTPATIENT
Start: 2020-04-05 | End: 2020-04-08 | Stop reason: HOSPADM

## 2020-04-05 RX ORDER — SODIUM CHLORIDE 9 MG/ML
INJECTION, SOLUTION INTRAVENOUS CONTINUOUS
Status: DISCONTINUED | OUTPATIENT
Start: 2020-04-05 | End: 2020-04-06

## 2020-04-05 RX ORDER — SODIUM CHLORIDE 0.9 % (FLUSH) 0.9 %
10 SYRINGE (ML) INJECTION EVERY 12 HOURS SCHEDULED
Status: DISCONTINUED | OUTPATIENT
Start: 2020-04-05 | End: 2020-04-06

## 2020-04-05 RX ORDER — POLYETHYLENE GLYCOL 3350 17 G/17G
17 POWDER, FOR SOLUTION ORAL DAILY PRN
Status: DISCONTINUED | OUTPATIENT
Start: 2020-04-05 | End: 2020-04-06

## 2020-04-05 RX ORDER — ONDANSETRON 2 MG/ML
4 INJECTION INTRAMUSCULAR; INTRAVENOUS EVERY 6 HOURS PRN
Status: DISCONTINUED | OUTPATIENT
Start: 2020-04-05 | End: 2020-04-08 | Stop reason: HOSPADM

## 2020-04-05 RX ORDER — ACETAMINOPHEN 650 MG/1
650 SUPPOSITORY RECTAL EVERY 6 HOURS PRN
Status: DISCONTINUED | OUTPATIENT
Start: 2020-04-05 | End: 2020-04-06

## 2020-04-05 RX ORDER — HYDROCHLOROTHIAZIDE 25 MG/1
12.5 TABLET ORAL DAILY
Status: DISCONTINUED | OUTPATIENT
Start: 2020-04-06 | End: 2020-04-08 | Stop reason: HOSPADM

## 2020-04-05 RX ADMIN — ACETAMINOPHEN 1000 MG: 500 TABLET, FILM COATED ORAL at 19:48

## 2020-04-05 RX ADMIN — Medication 10 ML: at 21:45

## 2020-04-05 RX ADMIN — LOSARTAN POTASSIUM 50 MG: 25 TABLET ORAL at 21:45

## 2020-04-05 RX ADMIN — SODIUM CHLORIDE: 9 INJECTION, SOLUTION INTRAVENOUS at 21:44

## 2020-04-05 ASSESSMENT — ENCOUNTER SYMPTOMS
DIARRHEA: 0
ABDOMINAL PAIN: 0
CHEST TIGHTNESS: 0
NAUSEA: 0
SHORTNESS OF BREATH: 0
VOMITING: 0

## 2020-04-05 ASSESSMENT — PAIN SCALES - GENERAL
PAINLEVEL_OUTOF10: 0
PAINLEVEL_OUTOF10: 3
PAINLEVEL_OUTOF10: 0

## 2020-04-05 NOTE — ED PROVIDER NOTES
I independently performed a history and physical on Mylene Garcia. All diagnostic, treatment, and disposition decisions were made by myself in conjunction with the advanced practice provider. Briefly, this is a 80 y.o. female here for fall, left sided hip pain. No other c/o  Mechanical fall, was walking taking her garbage out. Leg is shortened and rotated. MDM  Reviewed XR  Will admit    Patient Referrals:  Lanette Wong MD  [address] [place] [address], [address] [place] [address] Road  [phone]            Discharge Medications:  Current Discharge Medication List          FINAL IMPRESSION  1. Left displaced femoral neck fracture (HCC)        Blood pressure (!) 161/70, pulse 78, temperature 97.8 °F (36.6 °C), temperature source Oral, resp. rate 18, height 5' 4\" (1.626 m), weight 120 lb (54.4 kg), SpO2 96 %. For further details of Mylene Garcia's emergency department encounter, please see documentation by advanced practice provider, Alen Shaffer.         Odilon Sal MD  04/05/20 0072

## 2020-04-05 NOTE — ED NOTES
Bed: 21  Expected date:   Expected time:   Means of arrival:   Comments:  [address], RN  04/05/20 1684

## 2020-04-06 ENCOUNTER — ANESTHESIA EVENT (OUTPATIENT)
Dept: OPERATING ROOM | Age: 85
DRG: 470 | End: 2020-04-06
Payer: MEDICARE

## 2020-04-06 ENCOUNTER — APPOINTMENT (OUTPATIENT)
Dept: GENERAL RADIOLOGY | Age: 85
DRG: 470 | End: 2020-04-06
Payer: MEDICARE

## 2020-04-06 ENCOUNTER — ANESTHESIA (OUTPATIENT)
Dept: OPERATING ROOM | Age: 85
DRG: 470 | End: 2020-04-06
Payer: MEDICARE

## 2020-04-06 VITALS — OXYGEN SATURATION: 100 % | SYSTOLIC BLOOD PRESSURE: 112 MMHG | TEMPERATURE: 97.7 F | DIASTOLIC BLOOD PRESSURE: 52 MMHG

## 2020-04-06 PROBLEM — F41.1 GENERALIZED ANXIETY DISORDER: Chronic | Status: ACTIVE | Noted: 2020-04-06

## 2020-04-06 LAB
APTT: 35.4 SEC (ref 24.2–36.2)
GLUCOSE BLD-MCNC: 105 MG/DL (ref 70–99)
GLUCOSE BLD-MCNC: 135 MG/DL (ref 70–99)
GLUCOSE BLD-MCNC: 180 MG/DL (ref 70–99)
INR BLD: 1.03 (ref 0.86–1.14)
PERFORMED ON: ABNORMAL
PROTHROMBIN TIME: 12 SEC (ref 10–13.2)

## 2020-04-06 PROCEDURE — APPSS30 APP SPLIT SHARED TIME 16-30 MINUTES: Performed by: NURSE PRACTITIONER

## 2020-04-06 PROCEDURE — 2709999900 HC NON-CHARGEABLE SUPPLY: Performed by: ORTHOPAEDIC SURGERY

## 2020-04-06 PROCEDURE — 99221 1ST HOSP IP/OBS SF/LOW 40: CPT | Performed by: ORTHOPAEDIC SURGERY

## 2020-04-06 PROCEDURE — 85730 THROMBOPLASTIN TIME PARTIAL: CPT

## 2020-04-06 PROCEDURE — 2500000003 HC RX 250 WO HCPCS: Performed by: ORTHOPAEDIC SURGERY

## 2020-04-06 PROCEDURE — 3600000015 HC SURGERY LEVEL 5 ADDTL 15MIN: Performed by: ORTHOPAEDIC SURGERY

## 2020-04-06 PROCEDURE — 2580000003 HC RX 258: Performed by: NURSE ANESTHETIST, CERTIFIED REGISTERED

## 2020-04-06 PROCEDURE — 7100000001 HC PACU RECOVERY - ADDTL 15 MIN: Performed by: ORTHOPAEDIC SURGERY

## 2020-04-06 PROCEDURE — 1200000000 HC SEMI PRIVATE

## 2020-04-06 PROCEDURE — 0SRS0JA REPLACEMENT OF LEFT HIP JOINT, FEMORAL SURFACE WITH SYNTHETIC SUBSTITUTE, UNCEMENTED, OPEN APPROACH: ICD-10-PCS | Performed by: ORTHOPAEDIC SURGERY

## 2020-04-06 PROCEDURE — C1776 JOINT DEVICE (IMPLANTABLE): HCPCS | Performed by: ORTHOPAEDIC SURGERY

## 2020-04-06 PROCEDURE — 85610 PROTHROMBIN TIME: CPT

## 2020-04-06 PROCEDURE — 2580000003 HC RX 258: Performed by: ORTHOPAEDIC SURGERY

## 2020-04-06 PROCEDURE — 3600000014 HC SURGERY LEVEL 4 ADDTL 15MIN: Performed by: ORTHOPAEDIC SURGERY

## 2020-04-06 PROCEDURE — 72170 X-RAY EXAM OF PELVIS: CPT

## 2020-04-06 PROCEDURE — 3600000005 HC SURGERY LEVEL 5 BASE: Performed by: ORTHOPAEDIC SURGERY

## 2020-04-06 PROCEDURE — 94150 VITAL CAPACITY TEST: CPT

## 2020-04-06 PROCEDURE — 6360000002 HC RX W HCPCS: Performed by: ORTHOPAEDIC SURGERY

## 2020-04-06 PROCEDURE — 94761 N-INVAS EAR/PLS OXIMETRY MLT: CPT

## 2020-04-06 PROCEDURE — 27236 TREAT THIGH FRACTURE: CPT | Performed by: ORTHOPAEDIC SURGERY

## 2020-04-06 PROCEDURE — 3700000001 HC ADD 15 MINUTES (ANESTHESIA): Performed by: ORTHOPAEDIC SURGERY

## 2020-04-06 PROCEDURE — 7100000000 HC PACU RECOVERY - FIRST 15 MIN: Performed by: ORTHOPAEDIC SURGERY

## 2020-04-06 PROCEDURE — 2500000003 HC RX 250 WO HCPCS: Performed by: NURSE ANESTHETIST, CERTIFIED REGISTERED

## 2020-04-06 PROCEDURE — 2720000010 HC SURG SUPPLY STERILE: Performed by: ORTHOPAEDIC SURGERY

## 2020-04-06 PROCEDURE — 2580000003 HC RX 258: Performed by: INTERNAL MEDICINE

## 2020-04-06 PROCEDURE — 2700000000 HC OXYGEN THERAPY PER DAY

## 2020-04-06 PROCEDURE — 94760 N-INVAS EAR/PLS OXIMETRY 1: CPT

## 2020-04-06 PROCEDURE — 36415 COLL VENOUS BLD VENIPUNCTURE: CPT

## 2020-04-06 PROCEDURE — 6370000000 HC RX 637 (ALT 250 FOR IP): Performed by: ORTHOPAEDIC SURGERY

## 2020-04-06 PROCEDURE — 3700000000 HC ANESTHESIA ATTENDED CARE: Performed by: ORTHOPAEDIC SURGERY

## 2020-04-06 PROCEDURE — 6360000002 HC RX W HCPCS: Performed by: NURSE ANESTHETIST, CERTIFIED REGISTERED

## 2020-04-06 PROCEDURE — 3600000004 HC SURGERY LEVEL 4 BASE: Performed by: ORTHOPAEDIC SURGERY

## 2020-04-06 DEVICE — HEAD FEM DIA28MM NK L+1.5MM HIP MTL ON MTL 12/14 TAPR: Type: IMPLANTABLE DEVICE | Site: HIP | Status: FUNCTIONAL

## 2020-04-06 DEVICE — STEM FEM SZ 9 L130MM NK L38.5MM 38.5MM STD OFFSET 135DEG: Type: IMPLANTABLE DEVICE | Site: HIP | Status: FUNCTIONAL

## 2020-04-06 DEVICE — HEAD BPLR OD42MM ID28MM FEM HIP ECC SELF CNTR: Type: IMPLANTABLE DEVICE | Site: HIP | Status: FUNCTIONAL

## 2020-04-06 RX ORDER — ACETAMINOPHEN 500 MG
500 TABLET ORAL EVERY 4 HOURS PRN
Status: DISCONTINUED | OUTPATIENT
Start: 2020-04-06 | End: 2020-04-08 | Stop reason: HOSPADM

## 2020-04-06 RX ORDER — EPHEDRINE SULFATE/0.9% NACL/PF 50 MG/5 ML
SYRINGE (ML) INTRAVENOUS PRN
Status: DISCONTINUED | OUTPATIENT
Start: 2020-04-06 | End: 2020-04-06 | Stop reason: SDUPTHER

## 2020-04-06 RX ORDER — FENTANYL CITRATE 50 UG/ML
INJECTION, SOLUTION INTRAMUSCULAR; INTRAVENOUS PRN
Status: DISCONTINUED | OUTPATIENT
Start: 2020-04-06 | End: 2020-04-06 | Stop reason: SDUPTHER

## 2020-04-06 RX ORDER — DEXTROSE MONOHYDRATE 25 G/50ML
12.5 INJECTION, SOLUTION INTRAVENOUS PRN
Status: DISCONTINUED | OUTPATIENT
Start: 2020-04-06 | End: 2020-04-08 | Stop reason: HOSPADM

## 2020-04-06 RX ORDER — OXYCODONE HYDROCHLORIDE 5 MG/1
5 TABLET ORAL EVERY 4 HOURS PRN
Status: DISCONTINUED | OUTPATIENT
Start: 2020-04-06 | End: 2020-04-08 | Stop reason: HOSPADM

## 2020-04-06 RX ORDER — LIDOCAINE HYDROCHLORIDE 20 MG/ML
INJECTION, SOLUTION EPIDURAL; INFILTRATION; INTRACAUDAL; PERINEURAL PRN
Status: DISCONTINUED | OUTPATIENT
Start: 2020-04-06 | End: 2020-04-06 | Stop reason: SDUPTHER

## 2020-04-06 RX ORDER — NICOTINE POLACRILEX 4 MG
15 LOZENGE BUCCAL PRN
Status: DISCONTINUED | OUTPATIENT
Start: 2020-04-06 | End: 2020-04-08 | Stop reason: HOSPADM

## 2020-04-06 RX ORDER — ROCURONIUM BROMIDE 10 MG/ML
INJECTION, SOLUTION INTRAVENOUS PRN
Status: DISCONTINUED | OUTPATIENT
Start: 2020-04-06 | End: 2020-04-06 | Stop reason: SDUPTHER

## 2020-04-06 RX ORDER — MORPHINE SULFATE 2 MG/ML
2 INJECTION, SOLUTION INTRAMUSCULAR; INTRAVENOUS EVERY 4 HOURS PRN
Status: DISCONTINUED | OUTPATIENT
Start: 2020-04-06 | End: 2020-04-08 | Stop reason: HOSPADM

## 2020-04-06 RX ORDER — MAGNESIUM HYDROXIDE 1200 MG/15ML
LIQUID ORAL CONTINUOUS PRN
Status: COMPLETED | OUTPATIENT
Start: 2020-04-06 | End: 2020-04-06

## 2020-04-06 RX ORDER — ONDANSETRON 2 MG/ML
INJECTION INTRAMUSCULAR; INTRAVENOUS PRN
Status: DISCONTINUED | OUTPATIENT
Start: 2020-04-06 | End: 2020-04-06 | Stop reason: SDUPTHER

## 2020-04-06 RX ORDER — TRANEXAMIC ACID 100 MG/ML
INJECTION, SOLUTION INTRAVENOUS PRN
Status: DISCONTINUED | OUTPATIENT
Start: 2020-04-06 | End: 2020-04-06 | Stop reason: SDUPTHER

## 2020-04-06 RX ORDER — SUCCINYLCHOLINE/SOD CL,ISO/PF 200MG/10ML
SYRINGE (ML) INTRAVENOUS PRN
Status: DISCONTINUED | OUTPATIENT
Start: 2020-04-06 | End: 2020-04-06 | Stop reason: SDUPTHER

## 2020-04-06 RX ORDER — DEXTROSE MONOHYDRATE 50 MG/ML
100 INJECTION, SOLUTION INTRAVENOUS PRN
Status: DISCONTINUED | OUTPATIENT
Start: 2020-04-06 | End: 2020-04-08 | Stop reason: HOSPADM

## 2020-04-06 RX ORDER — INSULIN LISPRO 100 [IU]/ML
0-6 INJECTION, SOLUTION INTRAVENOUS; SUBCUTANEOUS
Status: DISCONTINUED | OUTPATIENT
Start: 2020-04-06 | End: 2020-04-08 | Stop reason: HOSPADM

## 2020-04-06 RX ORDER — SODIUM CHLORIDE 9 MG/ML
INJECTION, SOLUTION INTRAVENOUS CONTINUOUS
Status: DISCONTINUED | OUTPATIENT
Start: 2020-04-06 | End: 2020-04-08 | Stop reason: HOSPADM

## 2020-04-06 RX ORDER — SENNA AND DOCUSATE SODIUM 50; 8.6 MG/1; MG/1
1 TABLET, FILM COATED ORAL 2 TIMES DAILY
Status: DISCONTINUED | OUTPATIENT
Start: 2020-04-06 | End: 2020-04-08 | Stop reason: HOSPADM

## 2020-04-06 RX ORDER — DEXAMETHASONE SODIUM PHOSPHATE 4 MG/ML
INJECTION, SOLUTION INTRA-ARTICULAR; INTRALESIONAL; INTRAMUSCULAR; INTRAVENOUS; SOFT TISSUE PRN
Status: DISCONTINUED | OUTPATIENT
Start: 2020-04-06 | End: 2020-04-06 | Stop reason: SDUPTHER

## 2020-04-06 RX ORDER — INSULIN LISPRO 100 [IU]/ML
0-3 INJECTION, SOLUTION INTRAVENOUS; SUBCUTANEOUS NIGHTLY
Status: DISCONTINUED | OUTPATIENT
Start: 2020-04-06 | End: 2020-04-08 | Stop reason: HOSPADM

## 2020-04-06 RX ORDER — OXYCODONE HYDROCHLORIDE 5 MG/1
10 TABLET ORAL EVERY 4 HOURS PRN
Status: DISCONTINUED | OUTPATIENT
Start: 2020-04-06 | End: 2020-04-08 | Stop reason: HOSPADM

## 2020-04-06 RX ORDER — CITALOPRAM 20 MG/1
10 TABLET ORAL DAILY
Status: DISCONTINUED | OUTPATIENT
Start: 2020-04-07 | End: 2020-04-08 | Stop reason: HOSPADM

## 2020-04-06 RX ORDER — INSULIN LISPRO 100 [IU]/ML
0.08 INJECTION, SOLUTION INTRAVENOUS; SUBCUTANEOUS
Status: DISCONTINUED | OUTPATIENT
Start: 2020-04-06 | End: 2020-04-06

## 2020-04-06 RX ORDER — MAGNESIUM HYDROXIDE 1200 MG/15ML
LIQUID ORAL
Status: COMPLETED | OUTPATIENT
Start: 2020-04-06 | End: 2020-04-06

## 2020-04-06 RX ORDER — PROPOFOL 10 MG/ML
INJECTION, EMULSION INTRAVENOUS PRN
Status: DISCONTINUED | OUTPATIENT
Start: 2020-04-06 | End: 2020-04-06 | Stop reason: SDUPTHER

## 2020-04-06 RX ORDER — MORPHINE SULFATE 4 MG/ML
4 INJECTION, SOLUTION INTRAMUSCULAR; INTRAVENOUS EVERY 4 HOURS PRN
Status: DISCONTINUED | OUTPATIENT
Start: 2020-04-06 | End: 2020-04-08 | Stop reason: HOSPADM

## 2020-04-06 RX ORDER — SODIUM CHLORIDE 9 MG/ML
INJECTION, SOLUTION INTRAVENOUS CONTINUOUS PRN
Status: DISCONTINUED | OUTPATIENT
Start: 2020-04-06 | End: 2020-04-06 | Stop reason: SDUPTHER

## 2020-04-06 RX ADMIN — SODIUM CHLORIDE: 9 INJECTION, SOLUTION INTRAVENOUS at 06:55

## 2020-04-06 RX ADMIN — SUGAMMADEX 100 MG: 100 INJECTION, SOLUTION INTRAVENOUS at 12:26

## 2020-04-06 RX ADMIN — SODIUM CHLORIDE: 9 INJECTION, SOLUTION INTRAVENOUS at 12:29

## 2020-04-06 RX ADMIN — DEXAMETHASONE SODIUM PHOSPHATE 4 MG: 4 INJECTION, SOLUTION INTRAMUSCULAR; INTRAVENOUS at 11:05

## 2020-04-06 RX ADMIN — PROPOFOL 90 MG: 10 INJECTION, EMULSION INTRAVENOUS at 10:48

## 2020-04-06 RX ADMIN — CEFAZOLIN 2 G: 10 INJECTION, POWDER, FOR SOLUTION INTRAVENOUS at 10:40

## 2020-04-06 RX ADMIN — SENNOSIDES AND DOCUSATE SODIUM 1 TABLET: 8.6; 5 TABLET ORAL at 16:57

## 2020-04-06 RX ADMIN — Medication 100 MG: at 10:48

## 2020-04-06 RX ADMIN — PROPOFOL 20 MG: 10 INJECTION, EMULSION INTRAVENOUS at 12:16

## 2020-04-06 RX ADMIN — ROCURONIUM BROMIDE 10 MG: 10 INJECTION, SOLUTION INTRAVENOUS at 11:35

## 2020-04-06 RX ADMIN — INSULIN LISPRO 1 UNITS: 100 INJECTION, SOLUTION INTRAVENOUS; SUBCUTANEOUS at 21:35

## 2020-04-06 RX ADMIN — FENTANYL CITRATE 50 MCG: 50 INJECTION, SOLUTION INTRAMUSCULAR; INTRAVENOUS at 10:48

## 2020-04-06 RX ADMIN — SENNOSIDES AND DOCUSATE SODIUM 1 TABLET: 8.6; 5 TABLET ORAL at 20:51

## 2020-04-06 RX ADMIN — LIDOCAINE HYDROCHLORIDE 60 MG: 20 INJECTION, SOLUTION EPIDURAL; INFILTRATION; INTRACAUDAL; PERINEURAL at 10:48

## 2020-04-06 RX ADMIN — ROCURONIUM BROMIDE 30 MG: 10 INJECTION, SOLUTION INTRAVENOUS at 11:05

## 2020-04-06 RX ADMIN — TRANEXAMIC ACID 1000 MG: 1 INJECTION, SOLUTION INTRAVENOUS at 12:06

## 2020-04-06 RX ADMIN — ONDANSETRON 4 MG: 2 INJECTION INTRAMUSCULAR; INTRAVENOUS at 12:08

## 2020-04-06 RX ADMIN — TRANEXAMIC ACID 1000 MG: 1 INJECTION, SOLUTION INTRAVENOUS at 11:07

## 2020-04-06 RX ADMIN — FENTANYL CITRATE 50 MCG: 50 INJECTION, SOLUTION INTRAMUSCULAR; INTRAVENOUS at 12:16

## 2020-04-06 RX ADMIN — CEFAZOLIN 2 G: 330 INJECTION, POWDER, FOR SOLUTION INTRAMUSCULAR; INTRAVENOUS at 19:23

## 2020-04-06 RX ADMIN — LOSARTAN POTASSIUM 50 MG: 25 TABLET ORAL at 16:56

## 2020-04-06 RX ADMIN — Medication 10 MG: at 11:57

## 2020-04-06 RX ADMIN — HYDROCHLOROTHIAZIDE 12.5 MG: 25 TABLET ORAL at 16:56

## 2020-04-06 RX ADMIN — SODIUM CHLORIDE: 9 INJECTION, SOLUTION INTRAVENOUS at 14:30

## 2020-04-06 RX ADMIN — SODIUM CHLORIDE: 9 INJECTION, SOLUTION INTRAVENOUS at 10:42

## 2020-04-06 RX ADMIN — LOSARTAN POTASSIUM 50 MG: 25 TABLET ORAL at 20:51

## 2020-04-06 ASSESSMENT — PAIN DESCRIPTION - ORIENTATION
ORIENTATION: LEFT

## 2020-04-06 ASSESSMENT — PULMONARY FUNCTION TESTS
PIF_VALUE: 21
PIF_VALUE: 20
PIF_VALUE: 25
PIF_VALUE: 24
PIF_VALUE: 24
PIF_VALUE: 22
PIF_VALUE: 25
PIF_VALUE: 21
PIF_VALUE: 24
PIF_VALUE: 24
PIF_VALUE: 25
PIF_VALUE: 24
PIF_VALUE: 13
PIF_VALUE: 25
PIF_VALUE: 20
PIF_VALUE: 25
PIF_VALUE: 24

## 2020-04-06 ASSESSMENT — PAIN DESCRIPTION - LOCATION
LOCATION: HIP

## 2020-04-06 ASSESSMENT — PAIN DESCRIPTION - PAIN TYPE
TYPE: SURGICAL PAIN
TYPE: ACUTE PAIN
TYPE: SURGICAL PAIN

## 2020-04-06 ASSESSMENT — PAIN SCALES - GENERAL
PAINLEVEL_OUTOF10: 0

## 2020-04-06 ASSESSMENT — PAIN SCALES - WONG BAKER: WONGBAKER_NUMERICALRESPONSE: 0

## 2020-04-06 NOTE — ANESTHESIA PRE PROCEDURE
10 mL at 20    sodium chloride flush 0.9 % injection 10 mL  10 mL Intravenous PRN Karen Franks MD        acetaminophen (TYLENOL) tablet 650 mg  650 mg Oral Q6H PRN Karen Franks MD        Or    acetaminophen (TYLENOL) suppository 650 mg  650 mg Rectal Q6H PRN Karen Franks MD        polyethylene glycol [facility]) packet 17 g  17 g Oral Daily PRN Karen Franks MD        promethazine (PHENERGAN) tablet 12.5 mg  12.5 mg Oral Q6H PRN Karen Franks MD        Or    [facility] injection 4 mg  4 mg Intravenous Q6H PRN Karen Franks MD        enoxaparin (LOVENOX) injection 40 mg  40 mg Subcutaneous Daily Karen Franks MD        0.9 % sodium chloride infusion   Intravenous Continuous Karen Franks  mL/hr at 20 0655      HYDROcodone-acetaminophen (NORCO) 5-325 MG per tablet 1 tablet  1 tablet Oral Q4H PRN Karen Franks MD           Allergies: Allergies   Allergen Reactions    Ciprofloxacin Other (See Comments)     dizziness    Seasonal     Zoloft [Sertraline Hcl]        Problem List:    Patient Active Problem List   Diagnosis Code    [facility] S72.002A    Essential hypertension I10    Generalized anxiety disorder F41.1       Past Medical History:        Diagnosis Date    Hypertension     Vertigo     at times       Past Surgical History:        Procedure Laterality Date    APPENDECTOMY      HYSTERECTOMY      THYROID SURGERY         Social History:    Social History     Tobacco Use    Smoking status: Former Smoker     Last attempt to quit: 1960     Years since quittin.3    Smokeless tobacco: Never Used   Substance Use Topics    Alcohol use:  No                                Counseling given: Not Answered      Vital Signs (Current):   Vitals:    20 1913 20 2045 20 2115 20 0500   BP: (!) 191/78 (!) 157/55 (!) 161/70 (!) 148/76   Pulse: normal                    Neuro/Psych:   (+) psychiatric history:            GI/Hepatic/Renal:             Endo/Other:                     Abdominal:           Vascular:                                        Anesthesia Plan      general     ASA 2       Induction: intravenous. Anesthetic plan and risks discussed with patient. Plan discussed with CRNA.                   Denisha Tsang MD   4/6/2020

## 2020-04-06 NOTE — H&P
04/05/2020     CMP:    Lab Results   Component Value Date     04/05/2020    K 3.9 04/05/2020    CL 93 04/05/2020    CO2 25 04/05/2020    BUN 21 04/05/2020    CREATININE 0.6 04/05/2020    GFRAA >60 04/05/2020    GFRAA >60 09/08/2012    AGRATIO 1.6 04/05/2020    LABGLOM >60 04/05/2020    GLUCOSE 99 04/05/2020    PROT 7.8 04/05/2020    PROT 7.6 05/31/2012    LABALBU 4.8 04/05/2020    CALCIUM 9.4 04/05/2020    BILITOT 0.5 04/05/2020    ALKPHOS 115 04/05/2020    AST 32 04/05/2020    ALT 22 04/05/2020       Imaging  XR CHEST PORTABLE [[id number]] Collected: 04/05/20 2115      Order Status: Completed Updated: 04/05/20 2120     Narrative:       EXAMINATION:  ONE XRAY VIEW OF THE CHEST    4/5/2020 6:32 pm    COMPARISON:  July 15, 2019. HISTORY:  ORDERING SYSTEM PROVIDED HISTORY: hip fx  TECHNOLOGIST PROVIDED HISTORY:  Reason for exam:->hip fx  Reason for Exam: hip fx  Acuity: Acute  Type of Exam: Initial    FINDINGS:  Frontal portable view of the chest.  Normal lung volume.  Mild left basilar  subsegmental atelectasis/scarring.  No lobar consolidation.  No pleural  effusion or pneumothorax.  Tortuous and atherosclerotic thoracic aorta. Borderline cardiomegaly.  No acute osseous abnormality.     Impression:       Mild left basilar subsegmental atelectasis/scarring.  No lobar consolidation. Borderline cardiomegaly.     XR HIP LEFT (2-3 VIEWS) [[id number]] Collected: 04/05/20 1944     Order Status: Completed Updated: 04/05/20 1948     Narrative:       EXAMINATION:  TWO XRAY VIEWS OF THE LEFT HIP    4/5/2020 7:31 pm    COMPARISON:  05/18/2018    HISTORY:  ORDERING SYSTEM PROVIDED HISTORY: Injury  TECHNOLOGIST PROVIDED HISTORY:  Reason for exam:->Injury  Reason for Exam: Fall (pt was taking her garbage and tripped and fell. per  EMS, left leg shortned and rotated.  no pain when she is not moving it )  Acuity: Unknown  Type of Exam: Unknown    FINDINGS:  Osteopenia. Bre De Souza is an acute left femoral neck fracture with

## 2020-04-06 NOTE — DISCHARGE INSTR - COC
Continuity of Care Form    Patient Name: Charles Parrish   :  10/27/1931  MRN:  [id number]    Admit date:  2020  Discharge date:  2020    Code Status Order: Full Code   Advance Directives:   [facility] Documentation     Date/Time Healthcare Directive Type of Healthcare Directive Copy in [facility] [address] Agent's Name Healthcare Agent's Phone Number    20 1014  No, patient does not have an advance directive for healthcare treatment -- -- -- -- --    20 2320  No, patient does not have an advance directive for healthcare treatment -- -- -- -- --          Admitting Physician:  Alberta Angel MD  PCP: Alberta Angel MD    Discharging Nurse: [facility] Unit/Room#: 1GE-7066/5369-01  Discharging Unit Phone Number: [phone]    Emergency Contact:   Extended Emergency Contact Information  [facility] Phone: [phone]  Relation: Child  Secondary Emergency Contact[facility] Phone: [phone]  Relation: Child    Past Surgical History:  Past Surgical History:   Procedure Laterality Date    APPENDECTOMY      HYSTERECTOMY      THYROID SURGERY         Immunization History: There is no immunization history on file for this patient.     Active Problems:  Patient Active Problem List   Diagnosis Code    Left displaced femoral neck fracture ([facility] S72.002A    Essential hypertension I10    Generalized anxiety disorder F41.1       Isolation/Infection:   Isolation          No Isolation        Patient Infection Status     None to display          Nurse Assessment:  Last Vital Signs: /64   Pulse 65   Temp 97.5 °F (36.4 °C) (Oral)   Resp 14   Ht 5' 4\" (1.626 m)   Wt 120 lb (54.4 kg)   SpO2 99%   BMI 20.60 kg/m²     Last documented pain score (0-10 scale): Pain Level: 0  Last Weight:   Wt Readings from Last 1 Encounters:   20 120 lb (54.4 kg)     Mental Status:  {IP PT MENTAL stool softener/laxative as needed. Follow up with Dr. Darian Espinosa 2 weeks post op. Call [phone] for appt. DVT Prophylaxis:  ASA 81 mg twice daily x 30 days (double your normal home dose of ASA x 30 days)      Patient's personal belongings (please select all that are sent with patient):  Glasses    RN SIGNATURE:  Electronically signed by Bailey Pemberton RN on 4/7/20 at 4:11 PM EDT    CASE MANAGEMENT/SOCIAL WORK SECTION    Inpatient Status Date: 04/05/2020    Readmission Risk Assessment Score:  Readmission Risk              Risk of Unplanned Readmission:        11           Discharging to Facility/ Agency   · Name: [facility]  · [address] [place] [address]  · Phone:[phone]  · Fax: [phone]      / signature: Electronically signed by FROY Huggins on 4/7/2020 at 3:52 PM    PHYSICIAN SECTION    Prognosis: Good    Condition at Discharge: Stable    Rehab Potential (if transferring to Rehab): Good    Recommended Labs or Other Treatments After Discharge: PT/OT    Physician Certification: I certify the above information and transfer of Dede Sears  is necessary for the continuing treatment of the diagnosis listed and that she requires [facility] for less 30 days.      Update Admission H&P: No change in H&P    PHYSICIAN SIGNATURE:  Electronically signed by Jarett Kirkland MD on 4/7/20 at 3:19 PM EDT

## 2020-04-06 NOTE — OP NOTE
[facility]    DATE OF PROCEDURE: Monday, April 6, 2020. PREOPERATIVE DIAGNOSIS:  Left hip displaced femoral neck fracture. POSTOPERATIVE DIAGNOSIS:  same     OPERATION PERFORMED:  Left hip hemiarthroplasty, direct lateral.     ANESTHESIA:  General endotracheal.     SURGEON:  Darien Dillard MD     FIRST ASSISTANT:  Cristian Deutsch     BLOOD LOSS:  100 mL. COMPLICATIONS:  None. IMPLANTS:  Depuy hip arthroplasty system. 1.  Size 9 Corail stem, standard collar. 2.  28 mm femoral head, +1.5 mm.  3.  42 mm bipolar shell and liner. INDICATIONS FOR PROCEDURE:  The patient is a 80 y.o. female with a displaced femoral neck fracture. Please refer to my consult note for full preoperative discussion, including treatment options. Hemiarthroplasty was chosen as the treatment of choice, and verbal and written informed consent was obtained. The operative site was marked in the pre-operative holding area. OPERATIVE PROCEDURE:  The patient was brought back to the OR and transferred onto the operating room table. General anesthesia was administered by way of endotracheal tube. The patient was brought to the lateral decubitus position with the operative side up. An axillary roll was placed and all pressure points were well padded, including of the peroneal nerve. The patient was secured in this position using the hip positioner system. The operative hip was then prepped and draped in the usual sterile fashion, extending up the flank. An Ioban occlusive drape was used. A full time-out was now performed with all parties in agreement. The patient did receive ancef 2 gm for antibiotic prophylaxis. The patient was also dosed with IV tranexamic acid 1 gram.    Standard lateral hip incision centered over the greater trochanter was made and carried down onto the iliotibial band layer. This was split in line with the skin incision and Charnley retractors were placed.   Bursal tissue was excised and the anterior third of the gluteus medius was divided at the musculotendinous junction and retracted anteriorly. The gluteus minimus and hip capsule were then divided in a single layer using an L-shaped capsulotomy and tagged at the apex for retraction and later repair. Fracture hematoma was encountered and suctioned out. The hip labrum was preserved. The femoral neck fracture site was now exposed. Superior capsular tissue over the saddle region and the pubofemoral ligament at the calcar were released to facilitate exposure. Neck osteotomy was performed to freshen up the fracture site. Anterior and posterior acetabular retractors were then placed, and the femoral head fragment was retrieved using the power corkscrew and measured at the back table. A 42 mm head was trialed in the native acetabulum, which demonstrated appropriate sizing and great suction fit and stability. The box osteotome was used, followed by the canal finder, and then the [address] [place] [address] Avenue. The proximal femur was sequentially broached. With the size 9 broach, there was good fixation without evidence of toggling. Trial reduction was performed. The hip was brought through full range of motion, and there was no evidence of instability with hip flexion past 90 degrees, external rotation to 80 degrees, and in the resting/sleeping position. Internal rotation was to 60 degrees before it was tight without instability. Leg lengths were also manually assessed by palpating the ipsilateral and contralateral knee and heel, as well as the shuck test.  Using this information, adjustments were made as necessary to the broach size and seating, as well as neck length after the hip was re-dislocated. The calcar planer was used to smoothen the neck osteotomy. The surgical field was generously irrigated and the posterior capsule was injected with the periarticular injection. The formal stem was then implanted and malleted to the appropriate depth.

## 2020-04-06 NOTE — PROGRESS NOTES
Pt to PACU in bed and in semi fowlers position. Resp adeq on simple face mask,spo2 100%, VSS. Surgical dressing to left hip CDI; distal pulses palpable. Fluid infusing through IV per anesthesia. No s/s distress noted. Will monitor.

## 2020-04-06 NOTE — ED NOTES
Report given to Roseanne Moreno RN 4T. All questions answered. Pt discharged to floor via bed with all belongings.       Nikia Campos RN  04/05/20 5237

## 2020-04-06 NOTE — ED PROVIDER NOTES
all other systems were reviewed and negative. PAST MEDICAL HISTORY     Past Medical History:   Diagnosis Date    Hypertension     Vertigo     at times         SURGICAL HISTORY     Past Surgical History:   Procedure Laterality Date    APPENDECTOMY      HIP SURGERY Left 2020    LEFT BIPOLAR HIP HEMIARTHROPLASTY performed by Delia Clay MD at [facility]       Discharge Medication List as of 2020  4:24 PM      CONTINUE these medications which have NOT CHANGED    Details   losartan (COZAAR) 50 MG tablet Take 1 tablet by mouth 2 times daily, Disp-30 tablet, R-1Print      hydrochlorothiazide (MICROZIDE) 12.5 MG capsule Take 12.5 mg by mouth dailyHistorical Med      therapeutic multivitamin-minerals (THERAGRAN-M) tablet Take 1 tablet by mouth daily. Aloe-Sodium Chloride (AYR SALINE NASAL GEL) SWAB Apply in both nostrils twice a day for nasal dryness, Disp-1 each, R-2Normal      aspirin 81 MG EC tablet Take 1 tablet by mouth daily. Do not take this until the bleeding is gone for 48 hours. , Disp-30 tablet, R-0      calcium carbonate (OSCAL) 500 MG TABS tablet Take 500 mg by mouth daily.                  ALLERGIES     Ciprofloxacin; Seasonal; and Zoloft [sertraline hcl]    FAMILYHISTORY       Family History   Problem Relation Age of Onset    Diabetes Mother     Heart Disease Father           SOCIAL HISTORY       Social History     Tobacco Use    Smoking status: Former Smoker     Last attempt to quit: 1960     Years since quittin.3    Smokeless tobacco: Never Used   Substance Use Topics    Alcohol use: No    Drug use: No       SCREENINGS    Ember Coma Scale  Eye Opening: Spontaneous  Best Verbal Response: Oriented  Best Motor Response: Obeys commands  [place] Coma Scale Score: 15        PHYSICAL EXAM    (up to 7 for level 4, 8 or more for level 5)     ED Triage Vitals   BP Temp Temp Source Pulse Resp SpO2 Height Weight 04/05/20 1913 04/05/20 1906 04/05/20 1906 04/05/20 1906 04/05/20 1906 04/05/20 1906 04/05/20 1906 04/05/20 1906   (!) 191/78 97.9 °F (36.6 °C) Oral 75 18 98 % 5' 4\" (1.626 m) 120 lb (54.4 kg)       Physical Exam  Vitals signs and nursing note reviewed. Constitutional:       Appearance: She is well-developed. She is not diaphoretic. HENT:      Head: Normocephalic and atraumatic. Right Ear: External ear normal.      Left Ear: External ear normal.   Eyes:      General:         Right eye: No discharge. Left eye: No discharge. Neck:      Musculoskeletal: Normal range of motion and neck supple. Vascular: No JVD. Cardiovascular:      Rate and Rhythm: Normal rate and regular rhythm. Pulses: Normal pulses. Heart sounds: Normal heart sounds. Pulmonary:      Effort: Pulmonary effort is normal. No respiratory distress. Breath sounds: Normal breath sounds. Abdominal:      Palpations: Abdomen is soft. Musculoskeletal: Normal range of motion. Left hip: She exhibits tenderness. Skin:     General: Skin is warm and dry. Coloration: Skin is not pale. Neurological:      Mental Status: She is alert and oriented to person, place, and time.    Psychiatric:         Behavior: Behavior normal.         DIAGNOSTIC RESULTS   LABS:    Labs Reviewed   CBC WITH AUTO DIFFERENTIAL - Abnormal; Notable for the following components:       Result Value    WBC 3.8 (*)     All other components within normal limits    Narrative:     Performed at:  [facility] [address],  [place], [place]   Phone [phone]   COMPREHENSIVE METABOLIC PANEL - Abnormal; Notable for the following components:    Sodium 132 (*)     Chloride 93 (*)     BUN 21 (*)     All other components within normal limits    Narrative:     Performed at:  [facility] [address],  [place], [address]   Phone [phone]   CBC WITH AUTO DIFFERENTIAL - following components:    POC Glucose 108 (*)     All other components within normal limits    Narrative:     Performed at:  [facility] [address]  [place], [place]   Phone [phone]   POCT GLUCOSE - Abnormal; Notable for the following components:    POC Glucose 162 (*)     All other components within normal limits    Narrative:     Performed at:  [facility] [address]  [place], [address]   Phone [phone]   POCT GLUCOSE - Abnormal; Notable for the following components:    POC Glucose 177 (*)     All other components within normal limits    Narrative:     Performed at:  [facility] [address] [place], [facility] [place]   Phone [phone]   POCT GLUCOSE - Abnormal; Notable for the following components:    POC Glucose 107 (*)     All other components within normal limits    Narrative:     Performed at:  [facility] [address],  [place], [place]   Phone [phone]   PROTIME-INR    Narrative:     Performed at:  [facility] [address],  [place], [facility] [place]   Phone [phone]   APTT    Narrative:     Performed at:  [facility] [address], [facility] [place]   Phone [phone]   POCT GLUCOSE   POCT GLUCOSE   POCT GLUCOSE   POCT GLUCOSE   POCT GLUCOSE   POCT GLUCOSE   POCT GLUCOSE   POCT GLUCOSE   TYPE AND SCREEN    Narrative:     Performed at:  [facility] [address], [place]   Phone [phone]       All other labs were within normal range or not returned as of this dictation. EKG: All EKG's are interpreted by the Emergency Department Physician in the absence of a cardiologist.  Please see their note for interpretation of EKG.       RADIOLOGY:   Non-plain film images such as CT, Ultrasound and MRI are read by

## 2020-04-06 NOTE — CONSULTS
disturbance. ENT:  Negative for rhinorrhea, epistaxis, sore throat  Respiratory:  Negative for cough and shortness of breath. Cardiovascular: Negative for chest pain. Gastrointestinal: Negative for nausea, vomiting, diarrhea. Genitourinary: Negative for dysuria and difficulty urinating. Neurological: Negative for confusion, dysarthria, tremors, seizures. Psychiatric:  Negative for depression or anxiety  Musculoskeletal:  Positive for LEFT hip pain. Objective:  Vitals:    04/06/20 0500   BP: (!) 148/76   Pulse: 76   Resp: 16   Temp: 98.2 °F (36.8 °C)   SpO2: 96%      Physical Examination:  GENERAL: No apparent distress, well-nourished  SKIN:  Warm and dry  EYES: Nonicteric. ENT: Mucous membranes moist  HEAD: Normocephalic, atraumatic  RESPIRATORY: Resp easy and unlabored  CARDIOVASCULAR: Regular rate and rhythm  GI: Abdomen soft, nontender  NEURO: Awake and alert. No speech defect  PSYCHIATRIC: Appropriate affect; not agitated  MUSCULOSKELETAL:  LEFT LE  Inspection: Skin intact without lesion, laceration, swelling, ecchymosis, erythema.   Palpation: slightly tender to palpation  ROM:  Intact DF/PF  Sensation: intact in peroneal and tibial distributions  Vascular:  Intact post tib pulse  Sensory:    Right Upper Extremity:  normal  Left Upper Extremity:  normal  Right Lower Extremity:  normal  Left Lower Extremity:  normal    Labs reviewed:  Recent Labs     04/05/20 1943   WBC 3.8*   HGB 14.2   HCT 42.5        Recent Labs     04/05/20 1943   *   K 3.9   CL 93*   CO2 25   BUN 21*   CREATININE 0.6   GLUCOSE 99   CALCIUM 9.4     Recent Labs     04/06/20  0602   INR 1.03   PROTIME 12.0       Lab Results   Component Value Date    COLORU YELLOW 01/11/2019    CLARITYU CLOUDY (A) 01/11/2019    PHUR 8.0 01/11/2019    GLUCOSEU Negative 01/11/2019    BLOODU Negative 01/11/2019    LEUKOCYTESUR Negative 01/11/2019    NITRITE neg 12/17/2018    BILIRUBINUR Negative 01/11/2019    UROBILINOGEN 0.2 01/11/2019    RBCUA 2 01/11/2019    WBCUA 1 01/11/2019    BACTERIA 1+ (A) 01/11/2019       Imaging:  XR CHEST PORTABLE   Final Result   Mild left basilar subsegmental atelectasis/scarring. No lobar consolidation. Borderline cardiomegaly. XR HIP LEFT (2-3 VIEWS)   Final Result   Acute traumatic displaced left femoral neck fracture. IMPRESSION:  LEFT hip femoral neck fracture  HTN  Principal Problem:    Left displaced femoral neck fracture (HCC)  Active Problems:    Essential hypertension  Resolved Problems:    * No resolved hospital problems. *      RECOMMENDATIONS:  Schedule for LEFT hip hemiarthroplasty today with Dr. Macey Uribe; patient has been cleared. NPO  NWB LEFT LE  Pain control  DVT prophylaxis  Consult to hospitalist for pre-op clearance  Verify surgical consent    Patient does not use tobacco products. I have reviewed imaging and plan with Dr. Macey Uribe.         CLARITA KERR, SHAN-CNP  4/6/2020  9:41 AM

## 2020-04-06 NOTE — PLAN OF CARE
Problem: Falls - Risk of:  Goal: Will remain free from falls  Description: Will remain free from falls  4/6/2020 1141 by Darby De La Cruz RN  Outcome: Ongoing  4/5/2020 2322 by Sharon Yang RN  Outcome: Ongoing  Goal: Absence of physical injury  Description: Absence of physical injury  4/6/2020 1141 by Darby De La Cruz RN  Outcome: Ongoing  4/5/2020 2322 by Sharon Yang RN  Outcome: Ongoing     Problem: Pain:  Goal: Pain level will decrease  Description: Pain level will decrease  4/6/2020 1141 by Darby De La Cruz RN  Outcome: Ongoing  4/5/2020 2322 by Sharon Yang RN  Outcome: Ongoing  Goal: Control of acute pain  Description: Control of acute pain  4/6/2020 1141 by Darby De La Cruz RN  Outcome: Ongoing  4/5/2020 2322 by Sharon Yang RN  Outcome: Ongoing  Goal: Control of chronic pain  Description: Control of chronic pain  4/6/2020 1141 by Darby De La Cruz RN  Outcome: Ongoing  4/5/2020 2322 by Sharon Yang RN  Outcome: Ongoing

## 2020-04-07 LAB
ANION GAP SERPL CALCULATED.3IONS-SCNC: 11 MMOL/L (ref 3–16)
BASOPHILS ABSOLUTE: 0 K/UL (ref 0–0.2)
BASOPHILS RELATIVE PERCENT: 0.1 %
BUN BLDV-MCNC: 18 MG/DL (ref 7–20)
CALCIUM SERPL-MCNC: 8.2 MG/DL (ref 8.3–10.6)
CHLORIDE BLD-SCNC: 98 MMOL/L (ref 99–110)
CO2: 23 MMOL/L (ref 21–32)
CREAT SERPL-MCNC: 0.5 MG/DL (ref 0.6–1.2)
EKG ATRIAL RATE: 71 BPM
EKG DIAGNOSIS: NORMAL
EKG P AXIS: 72 DEGREES
EKG P-R INTERVAL: 148 MS
EKG Q-T INTERVAL: 420 MS
EKG QRS DURATION: 94 MS
EKG QTC CALCULATION (BAZETT): 456 MS
EKG R AXIS: -1 DEGREES
EKG T AXIS: 56 DEGREES
EKG VENTRICULAR RATE: 71 BPM
EOSINOPHILS ABSOLUTE: 0 K/UL (ref 0–0.6)
EOSINOPHILS RELATIVE PERCENT: 0.3 %
GFR AFRICAN AMERICAN: >60
GFR NON-AFRICAN AMERICAN: >60
GLUCOSE BLD-MCNC: 101 MG/DL (ref 70–99)
GLUCOSE BLD-MCNC: 108 MG/DL (ref 70–99)
GLUCOSE BLD-MCNC: 110 MG/DL (ref 70–99)
GLUCOSE BLD-MCNC: 162 MG/DL (ref 70–99)
GLUCOSE BLD-MCNC: 177 MG/DL (ref 70–99)
HCT VFR BLD CALC: 29.2 % (ref 36–48)
HEMOGLOBIN: 9.8 G/DL (ref 12–16)
LYMPHOCYTES ABSOLUTE: 0.8 K/UL (ref 1–5.1)
LYMPHOCYTES RELATIVE PERCENT: 8.8 %
MCH RBC QN AUTO: 29.6 PG (ref 26–34)
MCHC RBC AUTO-ENTMCNC: 33.7 G/DL (ref 31–36)
MCV RBC AUTO: 87.8 FL (ref 80–100)
MONOCYTES ABSOLUTE: 0.9 K/UL (ref 0–1.3)
MONOCYTES RELATIVE PERCENT: 10.4 %
NEUTROPHILS ABSOLUTE: 7 K/UL (ref 1.7–7.7)
NEUTROPHILS RELATIVE PERCENT: 80.4 %
PDW BLD-RTO: 14.2 % (ref 12.4–15.4)
PERFORMED ON: ABNORMAL
PLATELET # BLD: 133 K/UL (ref 135–450)
PMV BLD AUTO: 7.6 FL (ref 5–10.5)
POTASSIUM SERPL-SCNC: 4.1 MMOL/L (ref 3.5–5.1)
RBC # BLD: 3.33 M/UL (ref 4–5.2)
SODIUM BLD-SCNC: 132 MMOL/L (ref 136–145)
WBC # BLD: 8.8 K/UL (ref 4–11)

## 2020-04-07 PROCEDURE — 99024 POSTOP FOLLOW-UP VISIT: CPT | Performed by: ORTHOPAEDIC SURGERY

## 2020-04-07 PROCEDURE — 36415 COLL VENOUS BLD VENIPUNCTURE: CPT

## 2020-04-07 PROCEDURE — 93010 ELECTROCARDIOGRAM REPORT: CPT | Performed by: INTERNAL MEDICINE

## 2020-04-07 PROCEDURE — 6370000000 HC RX 637 (ALT 250 FOR IP): Performed by: ORTHOPAEDIC SURGERY

## 2020-04-07 PROCEDURE — 1200000000 HC SEMI PRIVATE

## 2020-04-07 PROCEDURE — 97530 THERAPEUTIC ACTIVITIES: CPT

## 2020-04-07 PROCEDURE — 6360000002 HC RX W HCPCS: Performed by: ORTHOPAEDIC SURGERY

## 2020-04-07 PROCEDURE — 97535 SELF CARE MNGMENT TRAINING: CPT

## 2020-04-07 PROCEDURE — 2580000003 HC RX 258: Performed by: ORTHOPAEDIC SURGERY

## 2020-04-07 PROCEDURE — 97166 OT EVAL MOD COMPLEX 45 MIN: CPT

## 2020-04-07 PROCEDURE — 85025 COMPLETE CBC W/AUTO DIFF WBC: CPT

## 2020-04-07 PROCEDURE — 80048 BASIC METABOLIC PNL TOTAL CA: CPT

## 2020-04-07 PROCEDURE — APPNB30 APP NON BILLABLE TIME 0-30 MINS: Performed by: NURSE PRACTITIONER

## 2020-04-07 PROCEDURE — 97162 PT EVAL MOD COMPLEX 30 MIN: CPT

## 2020-04-07 PROCEDURE — 97116 GAIT TRAINING THERAPY: CPT

## 2020-04-07 PROCEDURE — 99024 POSTOP FOLLOW-UP VISIT: CPT | Performed by: NURSE PRACTITIONER

## 2020-04-07 RX ORDER — CITALOPRAM 10 MG/1
10 TABLET ORAL DAILY
Qty: 30 TABLET | Refills: 3 | Status: SHIPPED | OUTPATIENT
Start: 2020-04-08

## 2020-04-07 RX ADMIN — OXYCODONE 5 MG: 5 TABLET ORAL at 17:55

## 2020-04-07 RX ADMIN — SENNOSIDES AND DOCUSATE SODIUM 1 TABLET: 8.6; 5 TABLET ORAL at 20:32

## 2020-04-07 RX ADMIN — CITALOPRAM HYDROBROMIDE 10 MG: 20 TABLET ORAL at 09:35

## 2020-04-07 RX ADMIN — SODIUM CHLORIDE: 9 INJECTION, SOLUTION INTRAVENOUS at 02:49

## 2020-04-07 RX ADMIN — INSULIN LISPRO 1 UNITS: 100 INJECTION, SOLUTION INTRAVENOUS; SUBCUTANEOUS at 16:51

## 2020-04-07 RX ADMIN — INSULIN LISPRO 1 UNITS: 100 INJECTION, SOLUTION INTRAVENOUS; SUBCUTANEOUS at 20:48

## 2020-04-07 RX ADMIN — LOSARTAN POTASSIUM 50 MG: 25 TABLET ORAL at 09:35

## 2020-04-07 RX ADMIN — CEFAZOLIN 2 G: 330 INJECTION, POWDER, FOR SOLUTION INTRAMUSCULAR; INTRAVENOUS at 02:53

## 2020-04-07 RX ADMIN — SODIUM CHLORIDE: 9 INJECTION, SOLUTION INTRAVENOUS at 18:06

## 2020-04-07 RX ADMIN — OXYCODONE 5 MG: 5 TABLET ORAL at 10:16

## 2020-04-07 RX ADMIN — SENNOSIDES AND DOCUSATE SODIUM 1 TABLET: 8.6; 5 TABLET ORAL at 09:35

## 2020-04-07 RX ADMIN — HYDROCHLOROTHIAZIDE 12.5 MG: 25 TABLET ORAL at 09:35

## 2020-04-07 RX ADMIN — LOSARTAN POTASSIUM 50 MG: 25 TABLET ORAL at 20:32

## 2020-04-07 RX ADMIN — ENOXAPARIN SODIUM 40 MG: 40 INJECTION SUBCUTANEOUS at 09:35

## 2020-04-07 ASSESSMENT — PAIN SCALES - GENERAL
PAINLEVEL_OUTOF10: 4
PAINLEVEL_OUTOF10: 0
PAINLEVEL_OUTOF10: 4

## 2020-04-07 ASSESSMENT — PAIN SCALES - WONG BAKER: WONGBAKER_NUMERICALRESPONSE: 6

## 2020-04-07 NOTE — PROGRESS NOTES
well;Other  Activity Tolerance: Pt reports mild dizziness following ambulation. Vitals post ambulation: 81/37, 96%, 63 BPM.  Following seated rest break: 113/46, 96%, HR 62.  RN notified of BP. Patient Diagnosis(es): The encounter diagnosis was Left displaced femoral neck fracture ([address] has a past medical history of Hypertension and Vertigo. has a past surgical history that includes Appendectomy; Hysterectomy; and Thyroid surgery. Restrictions  Restrictions/Precautions  Restrictions/Precautions: Weight Bearing, Up as Tolerated, Fall Risk, ROM Restrictions  Required Braces or Orthoses?: No  Lower Extremity Weight Bearing Restrictions  Left Lower Extremity Weight Bearing: Weight Bearing As Tolerated  Position Activity Restriction  Hip Precautions: No ABduction, No hip external rotation, No ADduction( No Adduction beyond neutral, no active abduction, no combined hip extension + external rotation. strict walker use for 6 weeks )  Other position/activity restrictions: This is an 66-year-old female with past medical history significant for hypertension and anxiety, the patient fell while taking her garbage out, she states that the walker slipped, she denies any chest pain, she denies any shortness of breath, she denies any loss of consciousness, x-rays consistent with left femoral neck fracture. L hip hemiarthroplasty on 4/6    Vision/Hearing  Vision: Impaired  Vision Exceptions: Wears glasses for distance  Hearing: Within functional limits       Subjective  General  Chart Reviewed: Yes  Patient assessed for rehabilitation services?: Yes  Family / Caregiver Present: No  Referring Practitioner: Dr. Margarita Fisher  Referral Date : 04/06/20  Diagnosis: (L) femoral neck fx s/p hemiarthroplasty  General Comment  Comments: Pt supine in bed upon arrival.  Agreeable to therapy session. Subjective  Subjective: Pt denies pain at rest.  Reports moderate pain during mobility but does not rate upon questioning.     Pain LLE: Exception  L Hip Flexion: 2-/5  L Knee Flexion: 3+/5  L Knee Extension: 3+/5  L Ankle Dorsiflexion: 4/5  Tone RLE  RLE Tone: Normotonic  Tone LLE  LLE Tone: Normotonic  Motor Control  Gross Motor?: WFL  Sensation  Overall Sensation Status: WFL(Denies N/T)  Bed mobility  Rolling to Right: Minimal assistance  Supine to Sit: Maximum assistance  Scooting: Minimal assistance  Comment: HOB elevated ~ 30* with use of HR. Transfers  Sit to Stand: Minimal Assistance  Stand to sit: Minimal Assistance  Bed to Chair: Minimal assistance  Stand Pivot Transfers: Minimal Assistance  Comment: RW utilized for all transfers. VC for hand placement and to back up completely to seated surface prior to sitting  Ambulation  Ambulation?: Yes  Ambulation 1  Surface: level tile  Device: Rolling Walker  Assistance: Minimal assistance  Quality of Gait: Decreased neeta with step to pattern. Forward flexed posture with increased (R) LE ER. Decreased neeta on (L) LE.   Distance: 20 ft  Stairs/Curb  Stairs?: No     Balance  Posture: Fair(forward flexed in all positions)  Sitting - Static: Fair;-(CGA/min (A) to correct (R) lateral lean due to decreased weight acceptance through (L) LE)  Sitting - Dynamic: Fair;-  Standing - Static: Fair;-(CGA with (B) UE support)  Standing - Dynamic: Poor;+(min (A) with UE support)      Plan   Plan  Times per week: 7x/week  Current Treatment Recommendations: Strengthening, ADL/Self-care Training, Gait Training, Balance Training, Transfer Training, Safety Education & Training, Manual Therapy - Soft Tissue Mobilization, Functional Mobility Training, Endurance Training, Neuromuscular Re-education, Pain Management, Modalities, Stair training, ROM  Safety Devices  Type of devices: Left in chair, Call light within reach, Chair alarm in place, Gait belt, Nurse notified    AM-PAC Score  AM-PAC Inpatient Mobility Raw Score : 15 (04/07/20 1320)  AM-PAC Inpatient T-Scale Score : 39.45 (04/07/20 1320)  Mobility

## 2020-04-07 NOTE — PROGRESS NOTES
[address] [facility] Orthopedic Surgery   Progress Note    CHIEF COMPLAINT/DIAGNOSIS:  4/4/20 LEFT hip hemiarthroplasty    SUBJECTIVE: Patient sitting up in bed; describes hip pain with movement only; has not yet taken pain medication. OBJECTIVE  Physical    VITALS:  /66   Pulse 75   Temp 97.7 °F (36.5 °C) (Axillary)   Resp 16   Ht 5' 4\" (1.626 m)   Wt 120 lb (54.4 kg)   SpO2 97%   BMI 20.60 kg/m²     GENERAL: Alert, NAD   MUSCULOSKELETAL: LEFT LE  INCISION:  Dressing c/d/i; expected post op swelling  ROM: intact DF/PF  Sensory:  Intact to light touch in peroneal and tibial distributions  Vascular:   Intact dors ped pulse;  calf soft and nontender    Data    ALL MEDICATIONS HAVE BEEN REVIEWED    CBC:   Recent Labs     04/05/20 1943   WBC 3.8*   HGB 14.2   HCT 42.5        BMP:   Recent Labs     04/05/20 1943   *   K 3.9   CL 93*   CO2 25   BUN 21*   CREATININE 0.6     INR:   Recent Labs     04/06/20  0602   INR 1.03       ASSESSMENT:  4/4/20 LEFT hip hemiarthroplasty, POD#1  HTN  Vertigo    PLAN:   - WB status:  WBAT;  Lateral hip precautions:   No combined hip (extension + external rotation). No adduction beyond neutral.  No active ABduction x 6 weeks postop. Strict walker use for 6 weeks postop.  - DVT prophylaxis: Lovenox; transition to ASA as OP  - PT/OT  - D/C Plan: await PT recs  - Pain Control: current regimen. Due to orthopaedic surgical procedure/condition, patient may require pain medication for up to 6-8 weeks. - F U with Dr. Ranjith Guerrero in 2 weeks; call [phone] for appt.       SHAN SALCEDO-CNP  4/7/2020  9:34 AM    .

## 2020-04-07 NOTE — PROGRESS NOTES
Occupational Therapy   Occupational Therapy Initial Assessment  Date: 2020   Patient Name: Karol Simon  MRN: [id number]     : 10/27/1931    Date of Service: 2020    Discharge Recommendations: Mylene Clark scored a 17/24 on the AM-PAC ADL Inpatient form. Current research shows that an AM-PAC score of 17 or less is typically not associated with a discharge to the patient's home setting. Based on the patients AM-PAC score and their current ADL deficits, it is recommended that the patient have 3-5 sessions per week of Occupational Therapy at d/c to increase the patients independence. If patient discharges prior to next session this note will serve as a discharge summary. Please see below for the latest assessment towards goals. OT Equipment Recommendations  Equipment Needed: Yes  Mobility Devices: ADL Assistive Devices  ADL Assistive Devices: Long-handled Sponge;Reacher;Sock-Aid Hard  Other: pt may benefit from a hip kit, continue to assess needs pending progress    Assessment   Performance deficits / Impairments: Decreased functional mobility ; Decreased endurance;Decreased ADL status; Decreased high-level IADLs;Decreased balance;Decreased safe awareness;Decreased strength  Assessment: Pt is a 80 yr old female who presents with the above deficits impacting her occupational performance.  Pt is not at baseline level and would benfit from continued occupational therapy services in order to maximize independence and safety  Treatment Diagnosis: multiple performance deficits associated with L Hip Hemiarthroplasty  Prognosis: Good  Decision Making: Medium Complexity  History: independent with ADLs and light IADL tasks, RW for mobility w/ reported history of falls in the home  Assistance / Modification: Leydi for mobility, MaxA LB ADLs  OT Education: OT Role;Plan of Care;ADL Adaptive Strategies;Transfer Training  Patient Education: pt requires continued education for safety and carryover  Barriers to hand placement, and progressing LEs, pt requires assist for safe RW use, ~15ft total EOB>around room>recliner  ADL  UE Bathing: Setup;Stand by assistance; Increased time to complete  LE Bathing: Minimal assistance;Setup;Verbal cueing; Increased time to complete(assist to wash feet)  UE Dressing: Minimal assistance;Setup;Verbal cueing(assist to thread UEs fully into gown)  LE Dressing: Verbal cueing; Increased time to complete;Maximum assistance(assist to doff/don socks and thread brief and don over hips)  Toileting: (declined need for BM; yee cath present)  Additional Comments: Pt completed UB/LB bathing and dressing seated EOB w/ war-wipes; cues for thoroughness and to initiate tasks, extended time required secondary to pt distractability   Tone RUE  RUE Tone: Normotonic  Tone LUE  LUE Tone: Normotonic  Coordination  Movements Are Fluid And Coordinated: Yes     Bed mobility  Rolling to Right: Minimal assistance  Supine to Sit: Maximum assistance  Scooting: Minimal assistance  Comment: HOB elevated, use of bed rail  Transfers  Sit to stand: Minimal assistance  Stand to sit: Minimal assistance  Transfer Comments: EOB>RW, cues for hand placement, posture and safety w/ RW     Cognition  Overall Cognitive Status: Exceptions  Arousal/Alertness: Appropriate responses to stimuli  Following Commands:  Follows multistep commands with repitition  Attention Span: Attends with cues to redirect  Safety Judgement: Decreased awareness of need for assistance  Insights: Decreased awareness of deficits  Initiation: Requires cues for some  Sequencing: Requires cues for some        Sensation  Overall Sensation Status: WFL(denies N/T)        LUE AROM (degrees)  LUE AROM : WFL  RUE AROM (degrees)  RUE AROM : WFL  LUE Strength  Gross LUE Strength: WFL  RUE Strength  Gross RUE Strength: WFL                   Plan   Plan  Times per week: 7x  Times per day: Daily  Specific instructions for Next Treatment: cotx  Current Treatment Recommendations: Safety Education & Training, Self-Care / ADL, Patient/Caregiver Education & Training, Equipment Evaluation, Education, & procurement, Functional Mobility Training, Balance Training      AM-PAC Score        AM-PAC Inpatient Daily Activity Raw Score: 17 (04/07/20 1324)  AM-PAC Inpatient ADL T-Scale Score : 37.26 (04/07/20 1324)  ADL Inpatient CMS 0-100% Score: 50.11 (04/07/20 1324)  ADL Inpatient CMS G-Code Modifier : CK (04/07/20 1324)    Goals  Short term goals  Time Frame for Short term goals: Discharge  Short term goal 1: Maria D for LB dressing/bathing w/ AE as needed  Short term goal 2: Maria D for UB dressing/bathing  Short term goal 3: Maria D for toileting  Short term goal 4: Maria D for functional transfers for ADL completion  Short term goal 5: Stance for 6+mins during ADL/light IADL task  Long term goals  Time Frame for Long term goals : LTG=STG  Patient Goals   Patient goals : Return home       Therapy Time   Individual Concurrent Group Co-treatment   Time In       6077   Time Out       0710   Minutes       56         Timed Code Treatment Minutes:  36  Total Treatment Minutes:  [address], [address] [facility], [facility] [address]

## 2020-04-07 NOTE — PLAN OF CARE
Problem: Falls - Risk of:  Goal: Will remain free from falls  Description: Will remain free from falls  4/7/2020 1916 by Yannick Shah RN  Outcome: Ongoing  4/7/2020 0950 by Macey Ugarte RN  Outcome: Ongoing  Goal: Absence of physical injury  Description: Absence of physical injury  4/7/2020 1916 by Yannick Shah RN  Outcome: Ongoing  4/7/2020 0950 by Macey Ugarte RN  Outcome: Ongoing     Problem: Pain:  Goal: Pain level will decrease  Description: Pain level will decrease  4/7/2020 1916 by Yannick Shah RN  Outcome: Ongoing  4/7/2020 0950 by Macey Ugarte RN  Outcome: Ongoing  Goal: Control of acute pain  Description: Control of acute pain  4/7/2020 1916 by Yannick Shah RN  Outcome: Ongoing  4/7/2020 0950 by Macey Ugarte RN  Outcome: Ongoing  Goal: Control of chronic pain  Description: Control of chronic pain  4/7/2020 1916 by Yannick Shah RN  Outcome: Ongoing  4/7/2020 0950 by Macey Ugarte RN  Outcome: Ongoing

## 2020-04-07 NOTE — PROGRESS NOTES
Department of Internal Medicine  Progress Note      SUBJECTIVE: The patient denies any chest pain or shortness of breath, she states that pain is controlled    Review of Systems - General ROS:denies any headache or weakness    Respiratory ROS: denies any shortness of breath, dyspnea on exertion, wheezing, or cough   Cardiovascular ROS: denies any chest pain, palpitations, shortness of breath, dizziness syncope or swelling in extremities  Gastrointestinal ROS: denies any abdominal pain, acid reflux, change in bowel habits or blood in stool, dark or tarry stools       OBJECTIVE:      PHYSICAL:   VITALS:  /66   Pulse 75   Temp 97.7 °F (36.5 °C) (Axillary)   Resp 16   Ht 5' 4\" (1.626 m)   Wt 120 lb (54.4 kg)   SpO2 97%   BMI 20.60 kg/m²   PULSE OXIMETRY RANGE: SpO2  Av.8 %  Min: 97 %  Max: 100 %    Intake/Output Summary (Last 24 hours) at 2020 1155  Last data filed at 2020 0630  Gross per 24 hour   Intake 1100 ml   Output 1550 ml   Net -450 ml      CONSTITUTIONAL:  awake, alert, cooperative, no apparent distress, and appears stated age  NECK:  Supple, symmetrical, trachea midline, no adenopathy, thyroid symmetric, not enlarged and no tenderness, skin normal  LUNGS:  No increased work of breathing, good air exchange, clear to auscultation bilaterally, no crackles or wheezing  CARDIOVASCULAR:  Normal apical impulse, regular rate and rhythm, normal S1 and S2, no S3 or S4, and no murmur noted  ABDOMEN:  No scars, normal bowel sounds, soft, non-distended, non-tender, no masses palpated, no hepatosplenomegally  NEUROLOGIC:  Awake, alert, oriented to name, place and time. Cranial nerves II-XII are grossly intact. Motor is 5 out of 5 bilaterally. Cerebellar finger to nose, heel to shin intact. Sensory is intact.   Babinski down going, Romberg negative, and gait is normal.  EDEMA: Normal    Data      CBC:   Lab Results   Component Value Date    WBC 3.8 2020    RBC 4.77 2020    HGB 14.2

## 2020-04-07 NOTE — PROGRESS NOTES
Shift assessment complete. VSS. Alert and oriented x4. See flowsheets. Morning medications given per MAR. The care plan and education has been reviewed and mutually agreed upon with the patient.

## 2020-04-07 NOTE — PROGRESS NOTES
Incentive Spirometry education and demonstration completed by Respiratory Therapy Yes      Response to education: Fair     Teaching Time: 5 minutes    Minimum Predicted Vital Capacity - 547 mL. Patient's Actual Vital Capacity - 500 mL.  Turning over to Nursing for routine follow-up No.    Comments: pt. Requires more education on IS    Electronically signed by Yani Davis RCP on 4/7/2020 at 5:34 AM

## 2020-04-08 VITALS
HEIGHT: 64 IN | TEMPERATURE: 98.4 F | BODY MASS INDEX: 20.49 KG/M2 | OXYGEN SATURATION: 96 % | DIASTOLIC BLOOD PRESSURE: 65 MMHG | RESPIRATION RATE: 16 BRPM | HEART RATE: 76 BPM | SYSTOLIC BLOOD PRESSURE: 125 MMHG | WEIGHT: 120 LBS

## 2020-04-08 LAB
GLUCOSE BLD-MCNC: 107 MG/DL (ref 70–99)
PERFORMED ON: ABNORMAL

## 2020-04-08 PROCEDURE — 2580000003 HC RX 258: Performed by: ORTHOPAEDIC SURGERY

## 2020-04-08 PROCEDURE — 99024 POSTOP FOLLOW-UP VISIT: CPT | Performed by: NURSE PRACTITIONER

## 2020-04-08 PROCEDURE — 6370000000 HC RX 637 (ALT 250 FOR IP): Performed by: ORTHOPAEDIC SURGERY

## 2020-04-08 PROCEDURE — APPNB15 APP NON BILLABLE TIME 0-15 MINS: Performed by: NURSE PRACTITIONER

## 2020-04-08 PROCEDURE — 6360000002 HC RX W HCPCS: Performed by: ORTHOPAEDIC SURGERY

## 2020-04-08 RX ORDER — ASPIRIN 81 MG/1
81 TABLET ORAL 2 TIMES DAILY
Qty: 60 TABLET | Refills: 0
Start: 2020-04-08 | End: 2020-05-08

## 2020-04-08 RX ADMIN — CITALOPRAM HYDROBROMIDE 10 MG: 20 TABLET ORAL at 10:10

## 2020-04-08 RX ADMIN — ENOXAPARIN SODIUM 40 MG: 40 INJECTION SUBCUTANEOUS at 10:09

## 2020-04-08 RX ADMIN — SODIUM CHLORIDE: 9 INJECTION, SOLUTION INTRAVENOUS at 05:11

## 2020-04-08 RX ADMIN — HYDROCHLOROTHIAZIDE 12.5 MG: 25 TABLET ORAL at 10:09

## 2020-04-08 RX ADMIN — LOSARTAN POTASSIUM 50 MG: 25 TABLET ORAL at 10:09

## 2020-04-08 RX ADMIN — SENNOSIDES AND DOCUSATE SODIUM 1 TABLET: 8.6; 5 TABLET ORAL at 10:10

## 2020-04-08 NOTE — PROGRESS NOTES
associated displacement superiorly. The hip joints are otherwise maintained. The SI joints are maintained. The pelvic ring is intact. Soft tissues are otherwise unremarkable. Acute traumatic displaced left femoral neck fracture. Xr Chest Portable    Result Date: 4/5/2020  EXAMINATION: ONE XRAY VIEW OF THE CHEST 4/5/2020 6:32 pm COMPARISON: July 15, 2019. HISTORY: ORDERING SYSTEM PROVIDED HISTORY: hip fx TECHNOLOGIST PROVIDED HISTORY: Reason for exam:->hip fx Reason for Exam: hip fx Acuity: Acute Type of Exam: Initial FINDINGS: Frontal portable view of the chest.  Normal lung volume. Mild left basilar subsegmental atelectasis/scarring. No lobar consolidation. No pleural effusion or pneumothorax. Tortuous and atherosclerotic thoracic aorta. Borderline cardiomegaly. No acute osseous abnormality. Mild left basilar subsegmental atelectasis/scarring. No lobar consolidation. Borderline cardiomegaly.    CBC with Differential:    Lab Results   Component Value Date    WBC 8.8 04/07/2020    RBC 3.33 04/07/2020    HGB 9.8 04/07/2020    HCT 29.2 04/07/2020     04/07/2020    MCV 87.8 04/07/2020    MCH 29.6 04/07/2020    MCHC 33.7 04/07/2020    RDW 14.2 04/07/2020    SEGSPCT 53.1 09/08/2012    LYMPHOPCT 8.8 04/07/2020    MONOPCT 10.4 04/07/2020    EOSPCT 0.7 09/29/2011    BASOPCT 0.1 04/07/2020    MONOSABS 0.9 04/07/2020    LYMPHSABS 0.8 04/07/2020    EOSABS 0.0 04/07/2020    BASOSABS 0.0 04/07/2020    DIFFTYPE Auto 09/08/2012     CMP:    Lab Results   Component Value Date     04/07/2020    K 4.1 04/07/2020    CL 98 04/07/2020    CO2 23 04/07/2020    BUN 18 04/07/2020    CREATININE 0.5 04/07/2020    GFRAA >60 04/07/2020    GFRAA >60 09/08/2012    AGRATIO 1.6 04/05/2020    LABGLOM >60 04/07/2020    GLUCOSE 110 04/07/2020    PROT 7.8 04/05/2020    PROT 7.6 05/31/2012    LABALBU 4.8 04/05/2020    CALCIUM 8.2 04/07/2020    BILITOT 0.5 04/05/2020    ALKPHOS 115 04/05/2020    AST 32 04/05/2020    ALT 22

## 2020-04-08 NOTE — PROGRESS NOTES
Shift assessment completed. Medications given per MAR. Patient denies any needs. The care plan and education has been reviewed and mutually agreed upon with the patient.

## 2020-04-08 NOTE — CARE COORDINATION
Discharge Plan:      Patient discharged TO FACILITY: [address]  PHONE: [phone]: [phone]  SW/DC Planner faxed, [address] and [address]   Narcotic Prescriptions faxed were: not applicable  RN: Zay Gonzalez will call report      Medical Transport with: [facility]  [phone]   time: 11:00 AM  Family advised of discharge?: SW spoke with patient's son. HENS Submitted?:  Yes  All discharge needs met per case management.     FROY Casey, [address] [facility]  [phone]    Electronically signed by FROY Aguirre on 4/8/2020 at 11:25 AM

## 2020-04-08 NOTE — PROGRESS NOTES
[facility] Orthopedic Surgery   Progress Note    CHIEF COMPLAINT/DIAGNOSIS:  4/4/20 LEFT hip hemiarthroplasty    SUBJECTIVE: Patient sitting up in bed; describes no pain at this time. OBJECTIVE  Physical    VITALS:  /63   Pulse 78   Temp 99.1 °F (37.3 °C) (Axillary)   Resp 16   Ht 5' 4\" (1.626 m)   Wt 120 lb (54.4 kg)   SpO2 94%   BMI 20.60 kg/m²     GENERAL: Alert, NAD   MUSCULOSKELETAL: LEFT LE  INCISION:  Dressing c/d/i  ROM: intact DF/PF  Sensory:  Intact to light touch in peroneal and tibial distributions  Vascular:   Intact post tib pulse;  calf soft and nontender    Data    ALL MEDICATIONS HAVE BEEN REVIEWED    CBC:   Recent Labs     04/05/20 1943 04/07/20  1137   WBC 3.8* 8.8   HGB 14.2 9.8*   HCT 42.5 29.2*    133*     BMP:   Recent Labs     04/05/20 1943 04/07/20  1137   * 132*   K 3.9 4.1   CL 93* 98*   CO2 25 23   BUN 21* 18   CREATININE 0.6 0.5*     INR:   Recent Labs     04/06/20  0602   INR 1.03       ASSESSMENT:   4/4/20 LEFT hip hemiarthroplasty, POD#3  HTN  Vertigo    PLAN:   - WB status:  WBAT; lateral hip precautions   No combined hip (extension + external rotation). No adduction beyond neutral.  No active ABduction x 6 weeks postop. Strict walker use for 6 weeks postop  - DVT prophylaxis: Lovenox; transition to ASA 81 mg. BID x 30 days as OP  - PT/OT  - D/C Plan: SNF today  - Pain Control: current regimen. Due to orthopaedic surgical procedure/condition, patient may require pain medication for up to 6-8 weeks. - Expected post op acute blood loss anemia:  Hg. 9.8 g/dL  - F U with Dr. Kathi Howell in 2 weeks; call [phone] for appt.      SHAN SALCEDO-CNP  4/8/2020  9:38 AM    .

## 2020-04-08 NOTE — DISCHARGE SUMMARY
Physician Discharge Summary     Patient ID:  Bola Quiñones  [id number]  80 y.o.  10/27/1931    Admit date: 4/5/2020    Discharge date and time: 4/8/2020    Admitting Physician: Jefferson Kinsey MD     Discharge Physician: Jefferson Kinsey MD    Admission Diagnoses: Left displaced femoral neck fracture ([facility] [address] [S72.002A]  Left displaced femoral neck fracture [facility]) [S72.002A]    Discharge Diagnoses: Left displaced femoral neck fracture    Full Hospital Problem List:  Active Hospital Problems    Diagnosis Date Noted    [facility] iD Grav[place] 04/05/2020    Essential hypertension [I10] 04/05/2020       Discharged Condition: good    Hospital Course: This is an 80-year-old female with past medical history significant for hypertension and anxiety. The patient fell while taking her garbage out. She states that the walker slipped. She denies any chest pain or shortness of breath. She denies any loss of consciousness. Her x-rays were consistent with left femoral neck fracture. She was admitted for further evaluation and management. Ortho was consulted and pt underwent left hip hemiarthroplasty with Dr. Wilder Joseph on 4/6/2020. Pt did well postoperatively and was discharged to [facility] for rehab.       Disposition: SNF    Consults made during Hospitalization:  IP CONSULT TO ORTHOPEDIC SURGERY  IP CONSULT TO CASE MANAGEMENT    Treatment team at time of Discharge: Treatment Team: Attending Provider: Jefferson Kinsey MD; Consulting Physician: Arvil Hammans; Consulting Physician: Cristina Plascencia MD; Surgeon: Cristina Plascencia MD; Registered Nurse: Monet Manrique RN    Imaging Results:  Xr Pelvis (1-2 Views)    Result Date: 4/6/2020  EXAMINATION: ONE XRAY VIEW OF THE PELVIS 4/6/2020 1:31 pm COMPARISON: 04/05/2020 HISTORY: ORDERING SYSTEM PROVIDED HISTORY: s/p left hip hemiarthroplasty TECHNOLOGIST PROVIDED HISTORY: Low set AP pelvis only Reason for exam:->s/p left hip hemiarthroplasty Reason for Exam: s/p left hip hemiarthroplasty Acuity: Unknown Type of Exam: Ongoing FINDINGS: There is a left total hip arthroplasty with noncemented components. No acute fracture or dislocation. No acute hardware failure or loosening. Grossly normal alignment. Total hip arthropasty without acute hardware complication. Xr Hip Left (2-3 Views)    Result Date: 4/5/2020  EXAMINATION: TWO XRAY VIEWS OF THE LEFT HIP 4/5/2020 7:31 pm COMPARISON: 05/18/2018 HISTORY: ORDERING SYSTEM PROVIDED HISTORY: Injury TECHNOLOGIST PROVIDED HISTORY: Reason for exam:->Injury Reason for Exam: Fall (pt was taking her garbage and tripped and fell. per EMS, left leg shortned and rotated. no pain when she is not moving it ) Acuity: Unknown Type of Exam: Unknown FINDINGS: Osteopenia. There is an acute left femoral neck fracture with associated displacement superiorly. The hip joints are otherwise maintained. The SI joints are maintained. The pelvic ring is intact. Soft tissues are otherwise unremarkable. Acute traumatic displaced left femoral neck fracture. Xr Chest Portable    Result Date: 4/5/2020  EXAMINATION: ONE XRAY VIEW OF THE CHEST 4/5/2020 6:32 pm COMPARISON: July 15, 2019. HISTORY: ORDERING SYSTEM PROVIDED HISTORY: hip fx TECHNOLOGIST PROVIDED HISTORY: Reason for exam:->hip fx Reason for Exam: hip fx Acuity: Acute Type of Exam: Initial FINDINGS: Frontal portable view of the chest.  Normal lung volume. Mild left basilar subsegmental atelectasis/scarring. No lobar consolidation. No pleural effusion or pneumothorax. Tortuous and atherosclerotic thoracic aorta. Borderline cardiomegaly. No acute osseous abnormality. Mild left basilar subsegmental atelectasis/scarring. No lobar consolidation. Borderline cardiomegaly.        Discharge Exam:  /65   Pulse 76   Temp 98.4 °F (36.9 °C) (Axillary)   Resp 16   Ht 5' 4\" (1.626 m)   Wt 120 lb (54.4 kg)   SpO2 96%   BMI 20.60 kg/m²   General appearance: alert, appears stated age and cooperative  Head: Normocephalic, without obvious abnormality, atraumatic  Lungs: clear to auscultation bilaterally  Heart: regular rate and rhythm, S1, S2 normal, no murmur, click, rub or gallop  Abdomen: soft, non-tender; bowel sounds normal; no masses,  no organomegaly  Extremities: extremities normal, atraumatic, no cyanosis or edema, left lateral thigh incision c/d/i  Skin: Skin color, texture, turgor normal. No rashes or lesions  Neurologic: Grossly normal    Disposition: SNF    Condition: stable    Discharge Medications:   Garcia, [facility] [address] [address] Medication Instructions [facility]    Printed on:20 1120   Medication Information                      Aloe-Sodium Chloride (AYR SALINE NASAL GEL) SWAB  Apply in both nostrils twice a day for nasal dryness             aspirin 81 MG EC tablet  Take 1 tablet by mouth 2 times daily Take ASA 81 mg BID x 30 days, then resume normal home dose of ASA 81 mg. daily             calcium carbonate (OSCAL) 500 MG TABS tablet  Take 500 mg by mouth daily. citalopram (CELEXA) 10 MG tablet  Take 1 tablet by mouth daily             hydrochlorothiazide (MICROZIDE) 12.5 MG capsule  Take 12.5 mg by mouth daily             losartan (COZAAR) 50 MG tablet  Take 1 tablet by mouth 2 times daily             therapeutic multivitamin-minerals (THERAGRAN-M) tablet  Take 1 tablet by mouth daily. Allergies: Allergies   Allergen Reactions    Ciprofloxacin Other (See Comments)     dizziness    Seasonal     Zoloft [Sertraline Hcl]          Patient Instructions: Activity: activity as defined by ortho,   Diet: regular diet  Wound Care: keep wound clean and dry    Follow up Instructions: Follow-up with PCP: Emeka Dia MD in  1 week.     Total time spent on day of discharge including face-to-face visit, examination, documentation, counseling, preparation of discharge plans and followup, and discharge medicine reconciliation

## 2020-04-17 ENCOUNTER — TELEPHONE (OUTPATIENT)
Dept: ORTHOPEDIC SURGERY | Age: 85
End: 2020-04-17

## 2020-04-17 NOTE — TELEPHONE ENCOUNTER
June Blanco from [facility] would like to schedule patient 1st post op appt.   She can be reached at [phone]

## 2020-04-20 ENCOUNTER — TELEPHONE (OUTPATIENT)
Dept: ORTHOPEDIC SURGERY | Age: 85
End: 2020-04-20

## 2020-04-21 ENCOUNTER — OFFICE VISIT (OUTPATIENT)
Dept: ORTHOPEDIC SURGERY | Age: 85
End: 2020-04-21

## 2020-04-21 VITALS — RESPIRATION RATE: 16 BRPM | TEMPERATURE: 99.1 F

## 2020-04-21 PROCEDURE — 99024 POSTOP FOLLOW-UP VISIT: CPT | Performed by: ORTHOPAEDIC SURGERY

## (undated) DEVICE — SUTURE VCRL SZ 1 L18IN ABSRB VLT CT L40MM 1/2 CIR SGL ARMED J753D

## (undated) DEVICE — 2108 SERIES SAGITTAL BLADE (24.8 X 0.88 X 73.8MM)

## (undated) DEVICE — GLOVE SURG SZ 85 L12IN THK104MIL CRM LTX SMOOTH PWD ST

## (undated) DEVICE — Device: Brand: POWER-FLO®

## (undated) DEVICE — COVER,MAYO STAND,XL,STERILE: Brand: MEDLINE

## (undated) DEVICE — ELECTRODE PT RET AD L9FT HI MOIST COND ADH HYDRGEL CORDED

## (undated) DEVICE — SUTURE VCRL SZ 2-0 L18IN ABSRB UD CT-1 L36MM 1/2 CIR J839D

## (undated) DEVICE — 3M™ IOBAN™ 2 ANTIMICROBIAL INCISE DRAPE 6650EZ: Brand: IOBAN™ 2

## (undated) DEVICE — HIP PILLOW, ABDUCTION: Brand: DEROYAL

## (undated) DEVICE — SUTURE STRATAFIX SZ 1 L14IN ABSRB VLT L36MM MO-4 TAPERPOINT SXPD2B400

## (undated) DEVICE — HANDPIECE SET WITH HIGH FLOW TIP AND SUCTION TUBE: Brand: INTERPULSE

## (undated) DEVICE — SYRINGE MED 30ML STD CLR PLAS LUERLOCK TIP N CTRL DISP

## (undated) DEVICE — DECANTER FLD 9IN ST BG FOR ASEP TRNSF OF FLD

## (undated) DEVICE — TOTAL BASIC PK

## (undated) DEVICE — DRAPE,U/ SHT,SPLIT,PLAS,STERIL: Brand: MEDLINE

## (undated) DEVICE — DRESSING CNTCT 4X12IN IS THERABOND 3D

## (undated) DEVICE — NEEDLE FLTR 19GA L1.5IN WALL THK5UM BRN POLYPR HUB S STL

## (undated) DEVICE — 3M™ STERI-STRIP™ REINFORCED ADHESIVE SKIN CLOSURES, R1547, 1/2 IN X 4 IN (12 MM X 100 MM), 6 STRIPS/ENVELOPE: Brand: 3M™ STERI-STRIP™

## (undated) DEVICE — MERCY FAIRFIELD TURNOVER KIT: Brand: MEDLINE INDUSTRIES, INC.

## (undated) DEVICE — SOLUTION IV IRRIG POUR BRL 0.9% SODIUM CHL 2F7124

## (undated) DEVICE — SUTURE MCRYL SZ 3-0 L27IN ABSRB UD L24MM PS-1 3/8 CIR PRIM Y936H

## (undated) DEVICE — YANKAUER OPEN TIP, NO VENT: Brand: ARGYLE

## (undated) DEVICE — SUTURE TICRN BR BL NDL C-20 SZ 5 30 8886302779

## (undated) DEVICE — CHLORAPREP 26ML ORANGE